# Patient Record
Sex: MALE | Race: WHITE | Employment: OTHER | URBAN - METROPOLITAN AREA
[De-identification: names, ages, dates, MRNs, and addresses within clinical notes are randomized per-mention and may not be internally consistent; named-entity substitution may affect disease eponyms.]

---

## 2019-02-28 ENCOUNTER — HOSPITAL ENCOUNTER (OUTPATIENT)
Dept: PREADMISSION TESTING | Age: 69
Discharge: HOME | End: 2019-02-28
Payer: MEDICARE

## 2019-02-28 VITALS
HEART RATE: 72 BPM | WEIGHT: 220.5 LBS | OXYGEN SATURATION: 98 % | HEIGHT: 73 IN | BODY MASS INDEX: 29.22 KG/M2 | TEMPERATURE: 98.5 F | DIASTOLIC BLOOD PRESSURE: 77 MMHG | SYSTOLIC BLOOD PRESSURE: 141 MMHG

## 2019-02-28 LAB
ABO + RH BLD: NORMAL
ANION GAP SERPL CALC-SCNC: 9 MMOL/L (ref 5–15)
APPEARANCE UR: CLEAR
BACTERIA URNS QL MICRO: NEGATIVE /HPF
BILIRUB UR QL: NEGATIVE
BLOOD GROUP ANTIBODIES SERPL: NORMAL
BUN SERPL-MCNC: 28 MG/DL (ref 6–20)
BUN/CREAT SERPL: 26 (ref 12–20)
CALCIUM SERPL-MCNC: 9.1 MG/DL (ref 8.5–10.1)
CHLORIDE SERPL-SCNC: 108 MMOL/L (ref 97–108)
CO2 SERPL-SCNC: 25 MMOL/L (ref 21–32)
COLOR UR: ABNORMAL
CREAT SERPL-MCNC: 1.06 MG/DL (ref 0.7–1.3)
EPITH CASTS URNS QL MICRO: ABNORMAL /LPF
ERYTHROCYTE [DISTWIDTH] IN BLOOD BY AUTOMATED COUNT: 12.3 % (ref 11.5–14.5)
EST. AVERAGE GLUCOSE BLD GHB EST-MCNC: 105 MG/DL
GLUCOSE SERPL-MCNC: 130 MG/DL (ref 65–100)
GLUCOSE UR STRIP.AUTO-MCNC: NEGATIVE MG/DL
HBA1C MFR BLD: 5.3 % (ref 4.2–6.3)
HCT VFR BLD AUTO: 48.7 % (ref 36.6–50.3)
HGB BLD-MCNC: 15.9 G/DL (ref 12.1–17)
HGB UR QL STRIP: NEGATIVE
INR PPP: 1 (ref 0.9–1.1)
KETONES UR QL STRIP.AUTO: NEGATIVE MG/DL
LEUKOCYTE ESTERASE UR QL STRIP.AUTO: NEGATIVE
MCH RBC QN AUTO: 31.8 PG (ref 26–34)
MCHC RBC AUTO-ENTMCNC: 32.6 G/DL (ref 30–36.5)
MCV RBC AUTO: 97.4 FL (ref 80–99)
NITRITE UR QL STRIP.AUTO: NEGATIVE
NRBC # BLD: 0 K/UL (ref 0–0.01)
NRBC BLD-RTO: 0 PER 100 WBC
PH UR STRIP: 5.5 [PH] (ref 5–8)
PLATELET # BLD AUTO: 233 K/UL (ref 150–400)
PMV BLD AUTO: 10.5 FL (ref 8.9–12.9)
POTASSIUM SERPL-SCNC: 4.6 MMOL/L (ref 3.5–5.1)
PROT UR STRIP-MCNC: NEGATIVE MG/DL
PROTHROMBIN TIME: 10.2 SEC (ref 9–11.1)
RBC # BLD AUTO: 5 M/UL (ref 4.1–5.7)
RBC #/AREA URNS HPF: ABNORMAL /HPF (ref 0–5)
SODIUM SERPL-SCNC: 142 MMOL/L (ref 136–145)
SP GR UR REFRACTOMETRY: >1.03 (ref 1–1.03)
SPECIMEN EXP DATE BLD: NORMAL
UA: UC IF INDICATED,UAUC: ABNORMAL
UROBILINOGEN UR QL STRIP.AUTO: 0.2 EU/DL (ref 0.2–1)
WBC # BLD AUTO: 8.1 K/UL (ref 4.1–11.1)
WBC URNS QL MICRO: ABNORMAL /HPF (ref 0–4)

## 2019-02-28 PROCEDURE — 85027 COMPLETE CBC AUTOMATED: CPT

## 2019-02-28 PROCEDURE — 83036 HEMOGLOBIN GLYCOSYLATED A1C: CPT

## 2019-02-28 PROCEDURE — 80048 BASIC METABOLIC PNL TOTAL CA: CPT

## 2019-02-28 PROCEDURE — 36415 COLL VENOUS BLD VENIPUNCTURE: CPT

## 2019-02-28 PROCEDURE — 86900 BLOOD TYPING SEROLOGIC ABO: CPT

## 2019-02-28 PROCEDURE — 93005 ELECTROCARDIOGRAM TRACING: CPT

## 2019-02-28 PROCEDURE — 85610 PROTHROMBIN TIME: CPT

## 2019-02-28 PROCEDURE — 81001 URINALYSIS AUTO W/SCOPE: CPT

## 2019-02-28 RX ORDER — ACETAMINOPHEN 500 MG
500 TABLET ORAL
COMMUNITY

## 2019-02-28 RX ORDER — IBUPROFEN 200 MG
400 TABLET ORAL
COMMUNITY
End: 2019-03-12

## 2019-02-28 RX ORDER — MOMETASONE FUROATE 1 MG/G
CREAM TOPICAL DAILY
COMMUNITY

## 2019-02-28 RX ORDER — BISMUTH SUBSALICYLATE 262 MG
1 TABLET,CHEWABLE ORAL DAILY
COMMUNITY

## 2019-02-28 RX ORDER — GALANTAMINE HYDROBROMIDE 8 MG/1
8 TABLET, FILM COATED ORAL DAILY
COMMUNITY

## 2019-03-01 LAB
ATRIAL RATE: 67 BPM
CALCULATED P AXIS, ECG09: 54 DEGREES
CALCULATED R AXIS, ECG10: 52 DEGREES
CALCULATED T AXIS, ECG11: 26 DEGREES
DIAGNOSIS, 93000: NORMAL
P-R INTERVAL, ECG05: 188 MS
Q-T INTERVAL, ECG07: 374 MS
QRS DURATION, ECG06: 86 MS
QTC CALCULATION (BEZET), ECG08: 395 MS
VENTRICULAR RATE, ECG03: 67 BPM

## 2019-03-02 LAB
BACTERIA SPEC CULT: NORMAL
BACTERIA SPEC CULT: NORMAL
SERVICE CMNT-IMP: NORMAL

## 2019-03-08 ENCOUNTER — ANESTHESIA EVENT (OUTPATIENT)
Dept: SURGERY | Age: 69
DRG: 470 | End: 2019-03-08
Payer: MEDICARE

## 2019-03-08 RX ORDER — ACETAMINOPHEN 500 MG
1000 TABLET ORAL ONCE
Status: CANCELLED | OUTPATIENT
Start: 2019-03-11 | End: 2019-03-11

## 2019-03-08 RX ORDER — CEFAZOLIN SODIUM/WATER 2 G/20 ML
2 SYRINGE (ML) INTRAVENOUS ONCE
Status: CANCELLED | OUTPATIENT
Start: 2019-03-11 | End: 2019-03-11

## 2019-03-08 RX ORDER — DEXAMETHASONE SODIUM PHOSPHATE 10 MG/ML
4 INJECTION INTRAMUSCULAR; INTRAVENOUS ONCE
Status: CANCELLED | OUTPATIENT
Start: 2019-03-11 | End: 2019-03-11

## 2019-03-08 RX ORDER — CELECOXIB 200 MG/1
200 CAPSULE ORAL ONCE
Status: CANCELLED | OUTPATIENT
Start: 2019-03-11 | End: 2019-03-11

## 2019-03-08 RX ORDER — PREGABALIN 75 MG/1
75 CAPSULE ORAL ONCE
Status: CANCELLED | OUTPATIENT
Start: 2019-03-11 | End: 2019-03-11

## 2019-03-11 ENCOUNTER — APPOINTMENT (OUTPATIENT)
Dept: GENERAL RADIOLOGY | Age: 69
DRG: 470 | End: 2019-03-11
Attending: ORTHOPAEDIC SURGERY
Payer: MEDICARE

## 2019-03-11 ENCOUNTER — ANESTHESIA (OUTPATIENT)
Dept: SURGERY | Age: 69
DRG: 470 | End: 2019-03-11
Payer: MEDICARE

## 2019-03-11 ENCOUNTER — HOSPITAL ENCOUNTER (INPATIENT)
Age: 69
LOS: 1 days | Discharge: HOME HEALTH | DRG: 470 | End: 2019-03-12
Attending: ORTHOPAEDIC SURGERY | Admitting: ORTHOPAEDIC SURGERY
Payer: MEDICARE

## 2019-03-11 DIAGNOSIS — M16.11 PRIMARY OSTEOARTHRITIS OF RIGHT HIP: Primary | ICD-10-CM

## 2019-03-11 LAB
GLUCOSE BLD STRIP.AUTO-MCNC: 104 MG/DL (ref 65–100)
SERVICE CMNT-IMP: ABNORMAL

## 2019-03-11 PROCEDURE — 74011250636 HC RX REV CODE- 250/636: Performed by: ORTHOPAEDIC SURGERY

## 2019-03-11 PROCEDURE — 97161 PT EVAL LOW COMPLEX 20 MIN: CPT

## 2019-03-11 PROCEDURE — 77030006802 HC BLD SAW RECIP BRSM -B: Performed by: ORTHOPAEDIC SURGERY

## 2019-03-11 PROCEDURE — C1776 JOINT DEVICE (IMPLANTABLE): HCPCS | Performed by: ORTHOPAEDIC SURGERY

## 2019-03-11 PROCEDURE — 77030031139 HC SUT VCRL2 J&J -A: Performed by: ORTHOPAEDIC SURGERY

## 2019-03-11 PROCEDURE — 76060000035 HC ANESTHESIA 2 TO 2.5 HR: Performed by: ORTHOPAEDIC SURGERY

## 2019-03-11 PROCEDURE — 74011000250 HC RX REV CODE- 250: Performed by: ORTHOPAEDIC SURGERY

## 2019-03-11 PROCEDURE — C9290 INJ, BUPIVACAINE LIPOSOME: HCPCS | Performed by: ORTHOPAEDIC SURGERY

## 2019-03-11 PROCEDURE — 77030002933 HC SUT MCRYL J&J -A: Performed by: ORTHOPAEDIC SURGERY

## 2019-03-11 PROCEDURE — 77030011264 HC ELECTRD BLD EXT COVD -A: Performed by: ORTHOPAEDIC SURGERY

## 2019-03-11 PROCEDURE — 65270000029 HC RM PRIVATE

## 2019-03-11 PROCEDURE — 77030011640 HC PAD GRND REM COVD -A: Performed by: ORTHOPAEDIC SURGERY

## 2019-03-11 PROCEDURE — 82962 GLUCOSE BLOOD TEST: CPT

## 2019-03-11 PROCEDURE — 77030018846 HC SOL IRR STRL H20 ICUM -A: Performed by: ORTHOPAEDIC SURGERY

## 2019-03-11 PROCEDURE — 74011250636 HC RX REV CODE- 250/636: Performed by: ANESTHESIOLOGY

## 2019-03-11 PROCEDURE — 76210000006 HC OR PH I REC 0.5 TO 1 HR: Performed by: ORTHOPAEDIC SURGERY

## 2019-03-11 PROCEDURE — 77030035236 HC SUT PDS STRATFX BARB J&J -B: Performed by: ORTHOPAEDIC SURGERY

## 2019-03-11 PROCEDURE — 77030039266 HC ADH SKN EXOFIN S2SG -A: Performed by: ORTHOPAEDIC SURGERY

## 2019-03-11 PROCEDURE — 77030007866 HC KT SPN ANES BBMI -B: Performed by: ANESTHESIOLOGY

## 2019-03-11 PROCEDURE — 77030010938 HC CLP LIG TELE -A: Performed by: ORTHOPAEDIC SURGERY

## 2019-03-11 PROCEDURE — 77030020788: Performed by: ORTHOPAEDIC SURGERY

## 2019-03-11 PROCEDURE — 77030034850: Performed by: ORTHOPAEDIC SURGERY

## 2019-03-11 PROCEDURE — 73501 X-RAY EXAM HIP UNI 1 VIEW: CPT

## 2019-03-11 PROCEDURE — 74011250636 HC RX REV CODE- 250/636

## 2019-03-11 PROCEDURE — 74011000250 HC RX REV CODE- 250

## 2019-03-11 PROCEDURE — 97116 GAIT TRAINING THERAPY: CPT

## 2019-03-11 PROCEDURE — 77030018547 HC SUT ETHBND1 J&J -B: Performed by: ORTHOPAEDIC SURGERY

## 2019-03-11 PROCEDURE — 74011000258 HC RX REV CODE- 258: Performed by: ORTHOPAEDIC SURGERY

## 2019-03-11 PROCEDURE — 77030018836 HC SOL IRR NACL ICUM -A: Performed by: ORTHOPAEDIC SURGERY

## 2019-03-11 PROCEDURE — 76000 FLUOROSCOPY <1 HR PHYS/QHP: CPT

## 2019-03-11 PROCEDURE — 77030036660

## 2019-03-11 PROCEDURE — 74011250637 HC RX REV CODE- 250/637: Performed by: ORTHOPAEDIC SURGERY

## 2019-03-11 PROCEDURE — 77030012935 HC DRSG AQUACEL BMS -B: Performed by: ORTHOPAEDIC SURGERY

## 2019-03-11 PROCEDURE — 0SR904Z REPLACEMENT OF RIGHT HIP JOINT WITH CERAMIC ON POLYETHYLENE SYNTHETIC SUBSTITUTE, OPEN APPROACH: ICD-10-PCS | Performed by: ORTHOPAEDIC SURGERY

## 2019-03-11 PROCEDURE — 77030027138 HC INCENT SPIROMETER -A

## 2019-03-11 PROCEDURE — 76010000171 HC OR TIME 2 TO 2.5 HR INTENSV-TIER 1: Performed by: ORTHOPAEDIC SURGERY

## 2019-03-11 PROCEDURE — 77030020782 HC GWN BAIR PAWS FLX 3M -B

## 2019-03-11 PROCEDURE — 74011000258 HC RX REV CODE- 258

## 2019-03-11 DEVICE — PINNACLE GRIPTION ACETABULAR SHELL SECTOR 56MM OD
Type: IMPLANTABLE DEVICE | Site: HIP | Status: FUNCTIONAL
Brand: PINNACLE GRIPTION

## 2019-03-11 DEVICE — BIOLOX DELTA CERAMIC FEMORAL HEAD +1.5 36MM DIA 12/14 TAPER
Type: IMPLANTABLE DEVICE | Site: HIP | Status: FUNCTIONAL
Brand: BIOLOX DELTA

## 2019-03-11 DEVICE — CORAIL HIP SYSTEM CEMENTLESS FEMORAL STEM 12/14 AMT 135 DEGREES KHO SIZE 12 HA COATED HIGH OFFSET NO COLLAR
Type: IMPLANTABLE DEVICE | Site: HIP | Status: FUNCTIONAL
Brand: CORAIL

## 2019-03-11 DEVICE — PINNACLE CANCELLOUS BONE SCREW 6.5MM X 30MM
Type: IMPLANTABLE DEVICE | Site: HIP | Status: FUNCTIONAL
Brand: PINNACLE

## 2019-03-11 DEVICE — COMPONENT TOT HIP PRIMARY CERM ALTRX: Type: IMPLANTABLE DEVICE | Site: HIP | Status: FUNCTIONAL

## 2019-03-11 DEVICE — PINNACLE CANCELLOUS BONE SCREW 6.5MM X 25MM
Type: IMPLANTABLE DEVICE | Site: HIP | Status: FUNCTIONAL
Brand: PINNACLE

## 2019-03-11 DEVICE — PINNACLE HIP SOLUTIONS ALTRX POLYETHYLENE ACETABULAR LINER NEUTRAL 36MM ID 56MM OD
Type: IMPLANTABLE DEVICE | Site: HIP | Status: FUNCTIONAL
Brand: PINNACLE ALTRX

## 2019-03-11 RX ORDER — SODIUM CHLORIDE, SODIUM LACTATE, POTASSIUM CHLORIDE, CALCIUM CHLORIDE 600; 310; 30; 20 MG/100ML; MG/100ML; MG/100ML; MG/100ML
125 INJECTION, SOLUTION INTRAVENOUS CONTINUOUS
Status: DISCONTINUED | OUTPATIENT
Start: 2019-03-11 | End: 2019-03-11 | Stop reason: HOSPADM

## 2019-03-11 RX ORDER — SODIUM CHLORIDE 0.9 % (FLUSH) 0.9 %
5-40 SYRINGE (ML) INJECTION AS NEEDED
Status: DISCONTINUED | OUTPATIENT
Start: 2019-03-11 | End: 2019-03-11 | Stop reason: HOSPADM

## 2019-03-11 RX ORDER — ONDANSETRON 2 MG/ML
INJECTION INTRAMUSCULAR; INTRAVENOUS AS NEEDED
Status: DISCONTINUED | OUTPATIENT
Start: 2019-03-11 | End: 2019-03-11 | Stop reason: HOSPADM

## 2019-03-11 RX ORDER — ONDANSETRON 2 MG/ML
4 INJECTION INTRAMUSCULAR; INTRAVENOUS AS NEEDED
Status: DISCONTINUED | OUTPATIENT
Start: 2019-03-11 | End: 2019-03-11 | Stop reason: HOSPADM

## 2019-03-11 RX ORDER — FAMOTIDINE 20 MG/1
20 TABLET, FILM COATED ORAL EVERY 12 HOURS
Qty: 30 TAB | Refills: 0 | Status: SHIPPED | OUTPATIENT
Start: 2019-03-11

## 2019-03-11 RX ORDER — OXYCODONE HYDROCHLORIDE 5 MG/1
5-10 TABLET ORAL
Status: DISCONTINUED | OUTPATIENT
Start: 2019-03-11 | End: 2019-03-12 | Stop reason: HOSPADM

## 2019-03-11 RX ORDER — SODIUM CHLORIDE 0.9 % (FLUSH) 0.9 %
5-40 SYRINGE (ML) INJECTION EVERY 8 HOURS
Status: DISCONTINUED | OUTPATIENT
Start: 2019-03-11 | End: 2019-03-12 | Stop reason: HOSPADM

## 2019-03-11 RX ORDER — ONDANSETRON 2 MG/ML
4 INJECTION INTRAMUSCULAR; INTRAVENOUS
Status: ACTIVE | OUTPATIENT
Start: 2019-03-11 | End: 2019-03-12

## 2019-03-11 RX ORDER — NALOXONE HYDROCHLORIDE 0.4 MG/ML
0.4 INJECTION, SOLUTION INTRAMUSCULAR; INTRAVENOUS; SUBCUTANEOUS AS NEEDED
Status: DISCONTINUED | OUTPATIENT
Start: 2019-03-11 | End: 2019-03-12 | Stop reason: HOSPADM

## 2019-03-11 RX ORDER — MIDAZOLAM HYDROCHLORIDE 1 MG/ML
INJECTION, SOLUTION INTRAMUSCULAR; INTRAVENOUS AS NEEDED
Status: DISCONTINUED | OUTPATIENT
Start: 2019-03-11 | End: 2019-03-11 | Stop reason: HOSPADM

## 2019-03-11 RX ORDER — FAMOTIDINE 20 MG/1
20 TABLET, FILM COATED ORAL EVERY 12 HOURS
Status: DISCONTINUED | OUTPATIENT
Start: 2019-03-11 | End: 2019-03-12 | Stop reason: HOSPADM

## 2019-03-11 RX ORDER — TRAMADOL HYDROCHLORIDE 50 MG/1
50 TABLET ORAL
Status: DISCONTINUED | OUTPATIENT
Start: 2019-03-11 | End: 2019-03-12 | Stop reason: HOSPADM

## 2019-03-11 RX ORDER — ACETAMINOPHEN 325 MG/1
650 TABLET ORAL EVERY 6 HOURS
Status: DISCONTINUED | OUTPATIENT
Start: 2019-03-11 | End: 2019-03-12 | Stop reason: HOSPADM

## 2019-03-11 RX ORDER — HYDROXYZINE HYDROCHLORIDE 10 MG/1
10 TABLET, FILM COATED ORAL
Status: DISCONTINUED | OUTPATIENT
Start: 2019-03-11 | End: 2019-03-12 | Stop reason: HOSPADM

## 2019-03-11 RX ORDER — DEXTROSE, SODIUM CHLORIDE, SODIUM LACTATE, POTASSIUM CHLORIDE, AND CALCIUM CHLORIDE 5; .6; .31; .03; .02 G/100ML; G/100ML; G/100ML; G/100ML; G/100ML
125 INJECTION, SOLUTION INTRAVENOUS CONTINUOUS
Status: DISCONTINUED | OUTPATIENT
Start: 2019-03-11 | End: 2019-03-11 | Stop reason: HOSPADM

## 2019-03-11 RX ORDER — CEFAZOLIN SODIUM/WATER 2 G/20 ML
2 SYRINGE (ML) INTRAVENOUS EVERY 8 HOURS
Status: COMPLETED | OUTPATIENT
Start: 2019-03-11 | End: 2019-03-12

## 2019-03-11 RX ORDER — POLYETHYLENE GLYCOL 3350 17 G/17G
17 POWDER, FOR SOLUTION ORAL DAILY
Status: DISCONTINUED | OUTPATIENT
Start: 2019-03-11 | End: 2019-03-12 | Stop reason: HOSPADM

## 2019-03-11 RX ORDER — OXYCODONE HYDROCHLORIDE 5 MG/1
5 TABLET ORAL
Qty: 60 TAB | Refills: 0 | Status: SHIPPED | OUTPATIENT
Start: 2019-03-11 | End: 2019-03-21

## 2019-03-11 RX ORDER — OXYCODONE HYDROCHLORIDE 5 MG/1
5 TABLET ORAL AS NEEDED
Status: DISCONTINUED | OUTPATIENT
Start: 2019-03-11 | End: 2019-03-11 | Stop reason: HOSPADM

## 2019-03-11 RX ORDER — DEXAMETHASONE SODIUM PHOSPHATE 4 MG/ML
INJECTION, SOLUTION INTRA-ARTICULAR; INTRALESIONAL; INTRAMUSCULAR; INTRAVENOUS; SOFT TISSUE AS NEEDED
Status: DISCONTINUED | OUTPATIENT
Start: 2019-03-11 | End: 2019-03-11 | Stop reason: HOSPADM

## 2019-03-11 RX ORDER — SODIUM CHLORIDE 0.9 % (FLUSH) 0.9 %
5-40 SYRINGE (ML) INJECTION EVERY 8 HOURS
Status: DISCONTINUED | OUTPATIENT
Start: 2019-03-11 | End: 2019-03-11 | Stop reason: HOSPADM

## 2019-03-11 RX ORDER — ASPIRIN 81 MG/1
81 TABLET ORAL EVERY 12 HOURS
Qty: 60 TAB | Refills: 0 | Status: SHIPPED | OUTPATIENT
Start: 2019-03-11

## 2019-03-11 RX ORDER — MIDAZOLAM HYDROCHLORIDE 1 MG/ML
1 INJECTION, SOLUTION INTRAMUSCULAR; INTRAVENOUS
Status: DISCONTINUED | OUTPATIENT
Start: 2019-03-11 | End: 2019-03-11 | Stop reason: HOSPADM

## 2019-03-11 RX ORDER — CELECOXIB 200 MG/1
200 CAPSULE ORAL ONCE
Status: COMPLETED | OUTPATIENT
Start: 2019-03-11 | End: 2019-03-11

## 2019-03-11 RX ORDER — DIPHENHYDRAMINE HYDROCHLORIDE 50 MG/ML
12.5 INJECTION, SOLUTION INTRAMUSCULAR; INTRAVENOUS AS NEEDED
Status: DISCONTINUED | OUTPATIENT
Start: 2019-03-11 | End: 2019-03-11 | Stop reason: HOSPADM

## 2019-03-11 RX ORDER — MIDAZOLAM HYDROCHLORIDE 1 MG/ML
1 INJECTION, SOLUTION INTRAMUSCULAR; INTRAVENOUS AS NEEDED
Status: DISCONTINUED | OUTPATIENT
Start: 2019-03-11 | End: 2019-03-11 | Stop reason: HOSPADM

## 2019-03-11 RX ORDER — PHENYLEPHRINE HCL IN 0.9% NACL 0.4MG/10ML
SYRINGE (ML) INTRAVENOUS AS NEEDED
Status: DISCONTINUED | OUTPATIENT
Start: 2019-03-11 | End: 2019-03-11 | Stop reason: HOSPADM

## 2019-03-11 RX ORDER — TRAMADOL HYDROCHLORIDE 50 MG/1
50 TABLET ORAL
Qty: 60 TAB | Refills: 0 | Status: SHIPPED | OUTPATIENT
Start: 2019-03-11 | End: 2019-03-14

## 2019-03-11 RX ORDER — FENTANYL CITRATE 50 UG/ML
25 INJECTION, SOLUTION INTRAMUSCULAR; INTRAVENOUS
Status: DISCONTINUED | OUTPATIENT
Start: 2019-03-11 | End: 2019-03-11 | Stop reason: HOSPADM

## 2019-03-11 RX ORDER — BUPIVACAINE HYDROCHLORIDE 5 MG/ML
INJECTION, SOLUTION EPIDURAL; INTRACAUDAL AS NEEDED
Status: DISCONTINUED | OUTPATIENT
Start: 2019-03-11 | End: 2019-03-11 | Stop reason: HOSPADM

## 2019-03-11 RX ORDER — AMOXICILLIN 250 MG
1 CAPSULE ORAL 2 TIMES DAILY
Qty: 30 TAB | Refills: 0 | Status: SHIPPED | OUTPATIENT
Start: 2019-03-11

## 2019-03-11 RX ORDER — PROPOFOL 10 MG/ML
INJECTION, EMULSION INTRAVENOUS AS NEEDED
Status: DISCONTINUED | OUTPATIENT
Start: 2019-03-11 | End: 2019-03-11 | Stop reason: HOSPADM

## 2019-03-11 RX ORDER — FENTANYL CITRATE 50 UG/ML
INJECTION, SOLUTION INTRAMUSCULAR; INTRAVENOUS AS NEEDED
Status: DISCONTINUED | OUTPATIENT
Start: 2019-03-11 | End: 2019-03-11 | Stop reason: HOSPADM

## 2019-03-11 RX ORDER — PREGABALIN 75 MG/1
75 CAPSULE ORAL ONCE
Status: COMPLETED | OUTPATIENT
Start: 2019-03-11 | End: 2019-03-11

## 2019-03-11 RX ORDER — SODIUM CHLORIDE 9 MG/ML
125 INJECTION, SOLUTION INTRAVENOUS CONTINUOUS
Status: DISPENSED | OUTPATIENT
Start: 2019-03-11 | End: 2019-03-12

## 2019-03-11 RX ORDER — ACETAMINOPHEN 500 MG
1000 TABLET ORAL ONCE
Status: COMPLETED | OUTPATIENT
Start: 2019-03-11 | End: 2019-03-11

## 2019-03-11 RX ORDER — GALANTAMINE HYDROBROMIDE 8 MG/1
8 CAPSULE, EXTENDED RELEASE ORAL
Status: DISCONTINUED | OUTPATIENT
Start: 2019-03-12 | End: 2019-03-12 | Stop reason: CLARIF

## 2019-03-11 RX ORDER — AMOXICILLIN 250 MG
1 CAPSULE ORAL 2 TIMES DAILY
Status: DISCONTINUED | OUTPATIENT
Start: 2019-03-11 | End: 2019-03-12 | Stop reason: HOSPADM

## 2019-03-11 RX ORDER — OXYCODONE HYDROCHLORIDE 5 MG/1
5 TABLET ORAL
Status: DISCONTINUED | OUTPATIENT
Start: 2019-03-11 | End: 2019-03-11

## 2019-03-11 RX ORDER — HYDROMORPHONE HYDROCHLORIDE 1 MG/ML
0.5 INJECTION, SOLUTION INTRAMUSCULAR; INTRAVENOUS; SUBCUTANEOUS
Status: ACTIVE | OUTPATIENT
Start: 2019-03-11 | End: 2019-03-12

## 2019-03-11 RX ORDER — CEFAZOLIN SODIUM/WATER 2 G/20 ML
2 SYRINGE (ML) INTRAVENOUS ONCE
Status: COMPLETED | OUTPATIENT
Start: 2019-03-11 | End: 2019-03-11

## 2019-03-11 RX ORDER — GALANTAMINE 4 MG/1
8 TABLET, FILM COATED ORAL DAILY
Status: DISCONTINUED | OUTPATIENT
Start: 2019-03-11 | End: 2019-03-11 | Stop reason: SDUPTHER

## 2019-03-11 RX ORDER — FACIAL-BODY WIPES
10 EACH TOPICAL DAILY PRN
Status: DISCONTINUED | OUTPATIENT
Start: 2019-03-13 | End: 2019-03-12 | Stop reason: HOSPADM

## 2019-03-11 RX ORDER — CLONIDINE HYDROCHLORIDE 0.1 MG/1
0.1 TABLET ORAL
Status: DISCONTINUED | OUTPATIENT
Start: 2019-03-11 | End: 2019-03-12 | Stop reason: HOSPADM

## 2019-03-11 RX ORDER — DEXAMETHASONE SODIUM PHOSPHATE 10 MG/ML
4 INJECTION INTRAMUSCULAR; INTRAVENOUS ONCE
Status: DISCONTINUED | OUTPATIENT
Start: 2019-03-11 | End: 2019-03-11 | Stop reason: HOSPADM

## 2019-03-11 RX ORDER — LIDOCAINE HYDROCHLORIDE 10 MG/ML
0.5 INJECTION, SOLUTION EPIDURAL; INFILTRATION; INTRACAUDAL; PERINEURAL AS NEEDED
Status: DISCONTINUED | OUTPATIENT
Start: 2019-03-11 | End: 2019-03-11 | Stop reason: HOSPADM

## 2019-03-11 RX ORDER — SODIUM CHLORIDE, SODIUM LACTATE, POTASSIUM CHLORIDE, CALCIUM CHLORIDE 600; 310; 30; 20 MG/100ML; MG/100ML; MG/100ML; MG/100ML
INJECTION, SOLUTION INTRAVENOUS
Status: DISCONTINUED | OUTPATIENT
Start: 2019-03-11 | End: 2019-03-11 | Stop reason: HOSPADM

## 2019-03-11 RX ORDER — FENTANYL CITRATE 50 UG/ML
50 INJECTION, SOLUTION INTRAMUSCULAR; INTRAVENOUS AS NEEDED
Status: DISCONTINUED | OUTPATIENT
Start: 2019-03-11 | End: 2019-03-11 | Stop reason: HOSPADM

## 2019-03-11 RX ORDER — MORPHINE SULFATE 10 MG/ML
2 INJECTION, SOLUTION INTRAMUSCULAR; INTRAVENOUS
Status: DISCONTINUED | OUTPATIENT
Start: 2019-03-11 | End: 2019-03-11 | Stop reason: HOSPADM

## 2019-03-11 RX ORDER — PROPOFOL 10 MG/ML
INJECTION, EMULSION INTRAVENOUS
Status: DISCONTINUED | OUTPATIENT
Start: 2019-03-11 | End: 2019-03-11 | Stop reason: HOSPADM

## 2019-03-11 RX ORDER — ASPIRIN 81 MG/1
81 TABLET ORAL EVERY 12 HOURS
Status: DISCONTINUED | OUTPATIENT
Start: 2019-03-11 | End: 2019-03-12 | Stop reason: HOSPADM

## 2019-03-11 RX ORDER — KETOROLAC TROMETHAMINE 30 MG/ML
15 INJECTION, SOLUTION INTRAMUSCULAR; INTRAVENOUS EVERY 6 HOURS
Status: COMPLETED | OUTPATIENT
Start: 2019-03-11 | End: 2019-03-12

## 2019-03-11 RX ORDER — THERA TABS 400 MCG
1 TAB ORAL DAILY
Status: DISCONTINUED | OUTPATIENT
Start: 2019-03-12 | End: 2019-03-12 | Stop reason: HOSPADM

## 2019-03-11 RX ORDER — SODIUM CHLORIDE 0.9 % (FLUSH) 0.9 %
5-40 SYRINGE (ML) INJECTION AS NEEDED
Status: DISCONTINUED | OUTPATIENT
Start: 2019-03-11 | End: 2019-03-12 | Stop reason: HOSPADM

## 2019-03-11 RX ADMIN — ASPIRIN 81 MG: 81 TABLET ORAL at 12:05

## 2019-03-11 RX ADMIN — Medication 2 G: at 15:31

## 2019-03-11 RX ADMIN — MIDAZOLAM HYDROCHLORIDE 2 MG: 1 INJECTION, SOLUTION INTRAMUSCULAR; INTRAVENOUS at 07:23

## 2019-03-11 RX ADMIN — POLYETHYLENE GLYCOL 3350 17 G: 17 POWDER, FOR SOLUTION ORAL at 12:05

## 2019-03-11 RX ADMIN — ACETAMINOPHEN 650 MG: 325 TABLET ORAL at 12:05

## 2019-03-11 RX ADMIN — SODIUM CHLORIDE, SODIUM LACTATE, POTASSIUM CHLORIDE, CALCIUM CHLORIDE: 600; 310; 30; 20 INJECTION, SOLUTION INTRAVENOUS at 07:27

## 2019-03-11 RX ADMIN — DEXAMETHASONE SODIUM PHOSPHATE 4 MG: 4 INJECTION, SOLUTION INTRA-ARTICULAR; INTRALESIONAL; INTRAMUSCULAR; INTRAVENOUS; SOFT TISSUE at 07:53

## 2019-03-11 RX ADMIN — Medication 10 ML: at 14:00

## 2019-03-11 RX ADMIN — Medication 2 G: at 07:38

## 2019-03-11 RX ADMIN — Medication 80 MCG: at 09:01

## 2019-03-11 RX ADMIN — SODIUM CHLORIDE 125 ML/HR: 900 INJECTION, SOLUTION INTRAVENOUS at 10:35

## 2019-03-11 RX ADMIN — PROPOFOL 20 MG: 10 INJECTION, EMULSION INTRAVENOUS at 09:00

## 2019-03-11 RX ADMIN — KETOROLAC TROMETHAMINE 15 MG: 30 INJECTION, SOLUTION INTRAMUSCULAR at 17:25

## 2019-03-11 RX ADMIN — Medication 40 MCG: at 08:42

## 2019-03-11 RX ADMIN — ACETAMINOPHEN 650 MG: 325 TABLET ORAL at 19:38

## 2019-03-11 RX ADMIN — FENTANYL CITRATE 50 MCG: 50 INJECTION, SOLUTION INTRAMUSCULAR; INTRAVENOUS at 07:29

## 2019-03-11 RX ADMIN — SENNOSIDES AND DOCUSATE SODIUM 1 TABLET: 8.6; 5 TABLET ORAL at 22:23

## 2019-03-11 RX ADMIN — FAMOTIDINE 20 MG: 20 TABLET ORAL at 22:23

## 2019-03-11 RX ADMIN — ONDANSETRON 4 MG: 2 INJECTION INTRAMUSCULAR; INTRAVENOUS at 07:53

## 2019-03-11 RX ADMIN — CELECOXIB 200 MG: 200 CAPSULE ORAL at 06:40

## 2019-03-11 RX ADMIN — Medication 40 MCG: at 08:40

## 2019-03-11 RX ADMIN — FAMOTIDINE 20 MG: 20 TABLET ORAL at 12:05

## 2019-03-11 RX ADMIN — PREGABALIN 75 MG: 75 CAPSULE ORAL at 06:40

## 2019-03-11 RX ADMIN — SODIUM CHLORIDE, SODIUM LACTATE, POTASSIUM CHLORIDE, CALCIUM CHLORIDE: 600; 310; 30; 20 INJECTION, SOLUTION INTRAVENOUS at 09:29

## 2019-03-11 RX ADMIN — Medication 80 MCG: at 08:45

## 2019-03-11 RX ADMIN — ASPIRIN 81 MG: 81 TABLET ORAL at 22:23

## 2019-03-11 RX ADMIN — PROPOFOL 20 MG: 10 INJECTION, EMULSION INTRAVENOUS at 07:34

## 2019-03-11 RX ADMIN — PROPOFOL 20 MG: 10 INJECTION, EMULSION INTRAVENOUS at 08:32

## 2019-03-11 RX ADMIN — FENTANYL CITRATE 50 MCG: 50 INJECTION, SOLUTION INTRAMUSCULAR; INTRAVENOUS at 07:23

## 2019-03-11 RX ADMIN — BUPIVACAINE HYDROCHLORIDE 10 MG: 5 INJECTION, SOLUTION EPIDURAL; INTRACAUDAL at 07:39

## 2019-03-11 RX ADMIN — PROPOFOL 50 MCG/KG/MIN: 10 INJECTION, EMULSION INTRAVENOUS at 07:30

## 2019-03-11 RX ADMIN — PROPOFOL 20 MG: 10 INJECTION, EMULSION INTRAVENOUS at 07:32

## 2019-03-11 RX ADMIN — SODIUM CHLORIDE, SODIUM LACTATE, POTASSIUM CHLORIDE, AND CALCIUM CHLORIDE 125 ML/HR: 600; 310; 30; 20 INJECTION, SOLUTION INTRAVENOUS at 06:40

## 2019-03-11 RX ADMIN — SENNOSIDES AND DOCUSATE SODIUM 1 TABLET: 8.6; 5 TABLET ORAL at 12:05

## 2019-03-11 RX ADMIN — ACETAMINOPHEN 1000 MG: 500 TABLET ORAL at 06:40

## 2019-03-11 NOTE — PROGRESS NOTES
Ortho Post-Op Note    3/11/2019  3:19 PM    POD:  Day of Surgery  S/P:  Procedure(s):  RIGHT TOTAL HIP ARTHROPLASTY ANTERIOR APPROACH  MAC/Spinal 10 mg    Afebrile/elevated BP's, NAD, A&O x 3  Doing well without complaints of nausea  Pain well controlled  Calves soft/NTTP Bilaterally  Incision OK, no drainage or dehiscence. Thigh soft  Dressing clean and dry  Moving lower extremities well. Neurocirculatory exam intact and within normal range. Lab Results   Component Value Date/Time    HGB 15.9 02/28/2019 12:32 PM    INR 1.0 02/28/2019 12:32 PM     Recent Labs     02/28/19  1232   CREA 1.06   BUN 28*     Estimated Creatinine Clearance: 85.2 mL/min (based on SCr of 1.06 mg/dL).     PLAN: Clonidine for hypertension (180/100)  DVT prophylaxis: ASA 81 mg bid  WBAT with PT-mobilization  Pain Control- toraldol, tramadol, oxycodone  Plan to D/C possibly home tomorrow after PT      SOURAV Hernadez

## 2019-03-11 NOTE — PROGRESS NOTES
Problem: Mobility Impaired (Adult and Pediatric)  Goal: *Acute Goals and Plan of Care (Insert Text)  Physical Therapy Goals  Initiated 3/11/2019    1. Patient will move from supine to sit and sit to supine , scoot up and down and roll side to side in bed with independence within 4 days. 2. Patient will perform sit to stand with modified independence within 4 days. 3. Patient will ambulate with modified independence for 300 feet with the least restrictive device within 4 days. 4. Patient will ascend/descend 3 stairs with one handrail(s) with modified independence within 4 days. 5. Patient will perform hip home exercise program per protocol with independence within 4 days. physical Therapy EVALUATION  Patient: Som Light (70 y.o. male)  Date: 3/11/2019  Primary Diagnosis: Primary osteoarthritis of right hip [M16.11]  Procedure(s) (LRB):  RIGHT TOTAL HIP ARTHROPLASTY ANTERIOR APPROACH (Right) Day of Surgery   Precautions:   WBAT, Fall    ASSESSMENT :  Based on the objective data described below, the patient presents with mobility at an overall supervision to contact guard level s/p a THR anterior approach POD 0. Vital signs monitored and recorded below, note elevated BP, discussed with his nurse. Also pt received after he had voided, and note urine was bloody, discussed with his nurse. Anticipate steady gains with mobility skills allowing for discharge to home and HHPT for follow up. Pt reports that he did attend the pre op class and his wife is his . .      Addendum at 24-20-52-61: per pt's nurse, pt is going to be placed on med for elevated BP. At baseline, pt does not take any BP meds, not diagnosed with any HTN.       Vitals:    03/11/19 1357 03/11/19 1402 03/11/19 1405 03/11/19 1409   BP: 155/89 152/88 (!) 171/93 (!) 169/97   BP 1 Location: Right arm Right arm Right arm Right arm   BP Patient Position: Supine;Pre-activity Sitting Standing Sitting;Post activity   Pulse: 81 87 90 81   Resp:       Temp: SpO2: on room air 98%   98%         Patient will benefit from skilled intervention to address the above impairments. Patients rehabilitation potential is considered to be Good  Factors which may influence rehabilitation potential include:   []         None noted  [x]         Mental ability/status, mild impairement  []         Medical condition  []         Home/family situation and support systems  []         Safety awareness  []         Pain tolerance/management  []         Other:      PLAN :  Recommendations and Planned Interventions:  [x]           Bed Mobility Training             []    Neuromuscular Re-Education  [x]           Transfer Training                   []    Orthotic/Prosthetic Training  [x]           Gait Training                         []    Modalities  [x]           Therapeutic Exercises           []    Edema Management/Control  [x]           Therapeutic Activities            [x]    Patient and Family Training/Education  []           Other (comment):    Frequency/Duration: Patient will be followed by physical therapy  twice daily to address goals. Discharge Recommendations: Home Health  Further Equipment Recommendations for Discharge: to be determined. PT will need to follow up tomorrow given pt unsure if he has a walker, needs to check with his wife. Addendum at [phone]: Pt does not have a walker. Order was entered and rehab tech notified. PT will need to follow up tomorrow. SUBJECTIVE:   Patient stated I like to golf, when I asked pt what he is looking forward to doing post surgery.     OBJECTIVE DATA SUMMARY:   Consult received, chart reviewed, pt cleared by nursing  HISTORY:    Past Medical History:   Diagnosis Date    Arthritis     Chronic pain     RIGHT HIP    MCI (mild cognitive impairment)      Past Surgical History:   Procedure Laterality Date    HX COLONOSCOPY  2017     Prior Level of Function/Home Situation: independent, not long retired  Personal factors and/or comorbidities impacting plan of care: MCI    Home Situation  Home Environment: Private residence  # Steps to Enter: 3  Rails to Enter: Yes  Hand Rails : Right  One/Two Story Residence: Two story, live on 1st floor  Living Alone: No  Support Systems: Spouse/Significant Other/Partner  Patient Expects to be Discharged to[de-identified] Private residence  Current DME Used/Available at Home: None(pt thinks, he is to clarify with his wife)    EXAMINATION/PRESENTATION/DECISION MAKING:   Critical Behavior:  Neurologic State: Alert, Appropriate for age  Orientation Level: Appropriate for age, Oriented X4  Cognition: Appropriate decision making, Appropriate for age attention/concentration, Appropriate safety awareness     Hearing: Auditory  Auditory Impairment: None  Skin:  Refer to MD and nursing notes, surgical bandage intact  Edema: none noted  Range Of Motion:  AROM: Generally decreased, functional                       Strength:    Strength: Generally decreased, functional                    Tone & Sensation:                  Sensation: Intact               Coordination:     Vision:      Functional Mobility:  Bed Mobility:     Supine to Sit: Supervision  Sit to Supine: Supervision     Transfers:  Sit to Stand: Contact guard assistance  Stand to Sit: Contact guard assistance                       Balance:   Sitting: Intact; Without support  Standing: Intact; With support  Ambulation/Gait Training:  Distance (ft): 40 Feet (ft)  Assistive Device: Gait belt;Walker, rolling  Ambulation - Level of Assistance: Contact guard assistance        Gait Abnormalities: Decreased step clearance; Step to gait  Right Side Weight Bearing: As tolerated     Base of Support: Widened  Stance: Right decreased  Speed/Nasreen: Slow  Step Length: Left shortened;Right shortened                     Stairs: Therapeutic Exercises:    Ankle pumps    Functional Measure:  Timed up and go:    Timed Get Up And Go Test: 20(extrapolated)       < than 10 seconds=Normal  Greater then 13.5 seconds (in elderly)=Increased fall risk   Fabien GOMES, Rita Bell, Susannah AMES. Predicting the probability for falls in community dwelling older adults using the Timed Up and Go Test. Phys Ther. 2000;80:896-903. Physical Therapy Evaluation Charge Determination   History Examination Presentation Decision-Making   MEDIUM  Complexity : 1-2 comorbidities / personal factors will impact the outcome/ POC  LOW Complexity : 1-2 Standardized tests and measures addressing body structure, function, activity limitation and / or participation in recreation  MEDIUM Complexity : Evolving with changing characteristics  LOW Complexity : FOTO score of       Based on the above components, the patient evaluation is determined to be of the following complexity level: LOW     Pain:  Pain Scale 1: Numeric (0 - 10)  Pain Intensity 1: 0  Pain Location 1: Hip  Pain Orientation 1: Right  Pain Description 1: Aching     Activity Tolerance:   See assessment above  Please refer to the flowsheet for vital signs taken during this treatment. After treatment:   []         Patient left in no apparent distress sitting up in chair  [x]         Patient left in no apparent distress in bed  [x]         Call bell left within reach  [x]         Nursing notified  []         Caregiver present  []         Bed alarm activated    COMMUNICATION/EDUCATION:   The patients plan of care was discussed with: Registered Nurse. [x]         Fall prevention education was provided and the patient/caregiver indicated understanding. [x]         Patient/family have participated as able in goal setting and plan of care. [x]         Patient/family agree to work toward stated goals and plan of care. []         Patient understands intent and goals of therapy, but is neutral about his/her participation. []         Patient is unable to participate in goal setting and plan of care.     Thank you for this referral.  Christina Espinal Jeramy   Time Calculation: 29 mins

## 2019-03-11 NOTE — PROGRESS NOTES
Occupational Therapy   Orders received, chart review completed. Note patient POD #0 from [facility]. Per pathway, OT will follow up on POD #1 for evaluation. Recommend OOB to chair three times a day for meals and self-completion of ADLs as able and medically stable. Thank you. Flavio Hollins, MS, OTR/L

## 2019-03-11 NOTE — DISCHARGE INSTRUCTIONS
Discharge Instructions Anterior Approach   Hip Replacement-Dr. George SGuadalupe Appsperse  Patient Name: Angela Ross  Date of procedure:3/11/2019                                   Procedure:Procedure(s):  RIGHT TOTAL HIP ARTHROPLASTY ANTERIOR APPROACH  Surgeon:Surgeon(s) and Role:     * Salvador Pandey MD - Primary               PCP: None  Date of discharge: 3/12/19                       Follow up appointments   Follow up with Dr. George SGuadalupe Appsperse in 4 weeks. Call [phone] extension [phone]) to make an appointment.  If home health has been arranged for you the agency will contact you to arrange dates/times for visits. Please call them if you do not hear from them within 24 hours after you are discharged. When to call your Orthopaedic Surgeon: Call [phone]. If you need to reach us after 5pm or on a weekend call [phone] and the on call physician will be contacted.  Increased hip pain; unrelieved pain or if you have difficulty or are unable to walk   Signs of infection-if your incision is red; continues to have drainage; drainage has a foul odor or if you have a persistent fever over 101 degrees   Signs of a blood clot in your leg-calf pain, tenderness, redness, swelling of lower leg  When to call your Primary Care Physician:   Concerns about medical conditions such as diabetes, high blood pressure, asthma, congestive heart failure   Call if blood sugars are elevated, persistent headache or dizziness, coughing or congestion, constipation or diarrhea, burning with urination, abnormal heart rate     When to call [phone] go to the nearest emergency room   Sudden onset of chest pain, shortness of breath, difficulty breathing     Activity   Weight bearing as tolerated with walker or crutches.  Refer to your handbook for instructions and pictures   Complete your Home Exercise Program daily as instructed by the physical therapist.  Refer to your handbook for instructions and pictures   Get up every one hour and walk (except at night when sleeping)   AVOID sudden and extreme movement of hip to prevent dislocation   Do not drive or operate heavy machinery    Incision Care   The Aquacel (brown, waterproof) surgical dressing is to remain on your hip for 7 days. On the 7th day have someone gently peel the dressing off by carefully lifting the edge and stretching it slightly to break the adhesive seal and leave incision open to air. You may take a shower with the Aquacel dressing in place   Once the Aquacel is removed, you may get your incision wet but do not submerge your incision under water in a bath tub, hot tub or swimming pool for 6 weeks after surgery. Preventing blood clots    Take aspirin 81 mg twice daily to prevent blood clots. Pain management   Please take scheduled 650 mg tylenol every 6 hours for 4 weeks   Please take scheduled diclofenac 50 mg    Please take tramadol every 6 hours for pain as needed for pain.  For breakthrough pain not relieved by above medications, please take 5-10 mg oxycodone      While taking aspirin and diclofenac, please take famotidine (PEPCID) 20 mg twice daily as prescribed to prevent stomach ulcers/irritation.  If you have a history of stomach ulcers, do not take diclofenac.  Avoid alcoholic beverages while taking pain medication   Do not take any over-the-counter medication for pain except Tylenol (acetaminophen)   Keep ice wrap in place except when walking. Change gel packs every 4 hours   Lie down and elevate your leg on pillows for about 30 minutes after walking to decrease swelling and pain       Diet   Resume usual diet; drink plenty of fluids; eat foods high in fiber   Please take a stool softener (such as Senokot-S or Colace) to prevent constipation while you are taking oxycodone. If constipation occurs, take a laxative (such as Dulcolax tablets, Milk of Magnesia, or a suppository).

## 2019-03-11 NOTE — H&P
H and P    Subjective:         Sal Wilson is a 79 y.o. male who presents with right hip osteoarthritis after failure of conservative mgmt. .     Patient Active Problem List    Diagnosis Date Noted    Primary osteoarthritis of right hip 2019     Family History   Problem Relation Age of Onset    Dementia Mother     Crohn's Disease Father     Anesth Problems Neg Hx       Social History     Tobacco Use    Smoking status: Former Smoker     Years: 10.00     Last attempt to quit:      Years since quittin.2    Smokeless tobacco: Current User   Substance Use Topics    Alcohol use: Yes     Alcohol/week: 2.4 oz     Types: 4 Glasses of wine per week     Past Medical History:   Diagnosis Date    Arthritis     Chronic pain     RIGHT HIP    MCI (mild cognitive impairment)       Past Surgical History:   Procedure Laterality Date    HX COLONOSCOPY        Prior to Admission medications    Medication Sig Start Date End Date Taking? Authorizing Provider   acetaminophen (TYLENOL EXTRA STRENGTH) 500 mg tablet Take 500 mg by mouth every six (6) hours as needed for Pain. Yes Provider, Historical   galantamine (RAZADYNE) 8 mg tablet Take 8 mg by mouth daily. Provider, Historical   multivitamin (ONE A DAY) tablet Take 1 Tab by mouth daily. Provider, Historical   ibuprofen (ADVIL) 200 mg tablet Take 400 mg by mouth every six (6) hours as needed for Pain. Provider, Historical   mometasone (ELOCON) 0.1 % topical cream Apply  to affected area daily.     Provider, Historical     Current Facility-Administered Medications   Medication Dose Route Frequency    lactated Ringers infusion  125 mL/hr IntraVENous CONTINUOUS    dextrose 5% lactated ringers infusion  125 mL/hr IntraVENous CONTINUOUS    sodium chloride (NS) flush 5-40 mL  5-40 mL IntraVENous Q8H    sodium chloride (NS) flush 5-40 mL  5-40 mL IntraVENous PRN    lidocaine (PF) (XYLOCAINE) 10 mg/mL (1 %) injection 0.5 mL  0.5 mL SubCUTAneous PRN  fentaNYL citrate (PF) injection 50 mcg  50 mcg IntraVENous PRN    midazolam (VERSED) injection 1 mg  1 mg IntraVENous PRN    midazolam (VERSED) injection 1 mg  1 mg IntraVENous PRN    ceFAZolin (ANCEF) 2 g/20 mL in sterile water IV syringe  2 g IntraVENous ONCE    dexamethasone (PF) (DECADRON) injection 4 mg  4 mg IntraVENous ONCE      No Known Allergies     Review of Systems:    Negative for fevers, chills, nausea, vomiting, chest pain, shortness of breath, headaches.               Objective:     Patient Vitals for the past 24 hrs:   Temp Pulse Resp BP SpO2   19 0634 98.5 °F (36.9 °C) 69 18 (!) 145/93 95 %       Temp (24hrs), Av.5 °F (36.9 °C), Min:98.5 °F (36.9 °C), Max:98.5 °F (36.9 °C)      Gen: NAD, A&Ox3  Resp: Non-labored breathing  CV: Extremities well perfused  Abd: soft, NT  RLE: pain with stinchfield, no swelling about knee or ankle, skin intact, warm well perfused, SILT in al distributions L3-S1, palpable pedal pulses, no calf tenderness    Imaging Review:     Labs:   Recent Results (from the past 24 hour(s))   GLUCOSE, POC    Collection Time: 19  7:03 AM   Result Value Ref Range    Glucose (POC) 104 (H) 65 - 100 mg/dL    Performed by [facility]          Current Facility-Administered Medications   Medication Dose Route Frequency    lactated Ringers infusion  125 mL/hr IntraVENous CONTINUOUS    dextrose 5% lactated ringers infusion  125 mL/hr IntraVENous CONTINUOUS    sodium chloride (NS) flush 5-40 mL  5-40 mL IntraVENous Q8H    sodium chloride (NS) flush 5-40 mL  5-40 mL IntraVENous PRN    lidocaine (PF) (XYLOCAINE) 10 mg/mL (1 %) injection 0.5 mL  0.5 mL SubCUTAneous PRN    fentaNYL citrate (PF) injection 50 mcg  50 mcg IntraVENous PRN    midazolam (VERSED) injection 1 mg  1 mg IntraVENous PRN    midazolam (VERSED) injection 1 mg  1 mg IntraVENous PRN    ceFAZolin (ANCEF) 2 g/20 mL in sterile water IV syringe  2 g IntraVENous ONCE    dexamethasone (PF) (DECADRON) injection 4 mg  4 mg IntraVENous ONCE         Impression:     Active Problems:    Primary osteoarthritis of right hip (3/11/2019)        Plan:   Plan for right Es Rocha MD

## 2019-03-11 NOTE — PROGRESS NOTES
Primary Nurse Grover Araya RN and Octavio Rdz RN performed a dual skin assessment on this patient No impairment noted  Fred score is 21    Patient does have a rash on the left arm that MD is aware of.

## 2019-03-11 NOTE — H&P
Date of Surgery Update:  Eric Montaño was seen and examined. History and physical has been reviewed. The patient has been examined. There have been no significant clinical changes since the completion of the originally dated History and Physical.  Patient identified by surgeon; surgical site was confirmed by patient and surgeon.     Signed By: Dorothea Jimenez MD     March 11, 2019 7:24 AM

## 2019-03-11 NOTE — ANESTHESIA PREPROCEDURE EVALUATION
Anesthetic History   No history of anesthetic complications            Review of Systems / Medical History  Patient summary reviewed, nursing notes reviewed and pertinent labs reviewed    Pulmonary  Within defined limits                 Neuro/Psych   Within defined limits           Cardiovascular  Within defined limits                     GI/Hepatic/Renal  Within defined limits              Endo/Other  Within defined limits      Arthritis     Other Findings              Physical Exam    Airway  Mallampati: II  TM Distance: > 6 cm  Neck ROM: normal range of motion   Mouth opening: Normal     Cardiovascular  Regular rate and rhythm,  S1 and S2 normal,  no murmur, click, rub, or gallop             Dental  No notable dental hx       Pulmonary  Breath sounds clear to auscultation               Abdominal  GI exam deferred       Other Findings            Anesthetic Plan    ASA: 2  Anesthesia type: MAC and spinal            Anesthetic plan and risks discussed with: Patient

## 2019-03-11 NOTE — OP NOTES
OPERATIVE REPORT  RIGHT TOTAL HIP REPLACEMENT (ANTERIOR APPROACH)    NAME: Washington Cooks  MRN: [de-identified]  :  1950  AGE: 79 y.o. DATE OF SURGERY:  3/11/2019    PREOPERATIVE DIAGNOSIS: Severe degenerative joint disease, right hip. POSTOPERATIVE DIAGNOSIS: Severe degenerative joint disease, right hip. PROCEDURES PERFORMED: Right total hip replacement - Anterior approach    SURGEON: Audelia Bueno MD.    ASSISTANTYuan Edwards    ANESTHESIA: epidural/spinal anesthesia    ESTIMATED BLOOD LOSS: 250 mL. DRAINS: None. COMPLICATIONS: None. SPECIMENS REMOVED: None. PRE-OP ANTIBIOTIC: Ancef 2 gram    IMPLANT:   Implant Name Type Inv. Item Serial No.  Lot No. LRB No. Used Action   CUP ACET SECTOR GRIPTION 56MM -- TI - SNA  CUP ACET SECTOR GRIPTION 56MM -- TI NA [facility] [id number] Right 1 Implanted   LINER ACET PINN NEUT [id number] -- ALTRX - SNA  LINER ACET PINN NEUT [id number] -- ALTRX [facility] [id number] Right 1 Implanted   SCR ACET CANC PINN 6.5X25MM SS --  - SNA  SCR ACET CANC PINN 6.5X25MM SS --  NA [facility] [id number] Right 1 Implanted   SCR BNE CANC PINN 6.5X30MM SS --  - SNA  SCR BNE CANC PINN 6.5X30MM SS --  NA [facility] [id number] Right 1 Implanted   STEM FEM CORAIL2 N-COL SZ 12 --  - SNA  STEM FEM CORAIL2 N-COL SZ 12 --  NA [facility] [id number] Right 1 Implanted   HEAD FEM 36MM +1.5MM NK -- BIOLOX DELTA - SNA  HEAD FEM 36MM +1.5MM NK -- BIOLOX DELTA NA [facility] [id number] Right 1 Implanted       INDICATIONS: The patient is an 79 yrs male with progressive debilitating right hip and groin pain due to progressive osteoarthritis. Symptoms have progressed despite comprehensive conservative treatment and they present for right total hip replacement. Risks include bleeding, infection, damage to the LFCN, leg length descrepency, blood clots, pulmonary embolism, and death.  The patient understood these risks as well as potential benefits and alternatives and elected to proceed. DESCRIPTION OF PROCEDURE: Anesthetic was initiated. Preoperative dose of intravenous antibiotic was given within 30 minutes of incision. One gram of tranexamic acid was also administered intravenously. 2 grams of tranexamic acid with 0.4mL of epi topically at the end of the case. The right side was confirmed during a timeout and the hip was prepped and draped in the usual sterile fashion. Skin was covered with [place] occlusive dressing. The patient underwent straight catheterization at the end of the case. Direct anterior exposure was made to the patient's hip through the sartorius-tensor interval well lateral of the ASIS to protect the LFCN. Anterior hip vasculature including the ascending branches of the lateral circumflex artery were cauterized. Retractors were taken out to observe for bleeding and there was none. A T capsulotomy was made with bovie electrocautery. The Charnley retractor was repositioned for exposure. The femoral neck was osteotomized. Femoral head was removed from the acetabulum, which was exposed and soft tissues were removed. The acetabulum was progressively  reamedand a 56 mm trial shell was impacted with good press-fit. This was removed and a 56 mm [place] Gription shell was impacted in the acetabulum in 40 degrees of abduction in an anatomic-type anteversion. Bone spurs were removed and 6.5 screws x2 were placed. The polyethylene liner was placed. Femur was positioned and elevated from the wound. Appropriate soft tissue releases were made including the posterior capsule and obturator internus. The medullary canal was entered with a box osteotome. The femoral canal was broached to a size 12. Calcar planed and then trialed. A 36 mm , +1 hip ball was the most appropriate for leg length and tension with offset to best match native offset. The hip was dislocated. The trial was removed and the real stem was impacted.  The real hip ball was placed. The hip was reduced. After copious irrigation, the capsule was closed with #1 Stratafix barbed sutures. I irrigated the skin, subcutaneous and deep wound. I closed the fascia of the tensor fascia logan with #1 stratafix barbed sutures. Skin and subcutaneous were irrigated. Soft tissues were infiltrated with local anesthetic. 2 grams of tranexamic acid with 0.4 mL of epi given topically in the wound. Dilute betadine (17mL:1000mL of saline) was used to irrigate the wound followed by a rinse with the pulse lavage with normal saline. Skin and subcutaneous were closed in a standard fashion with 2-0 vicryl and 3-0 monocryl followed by Dermabond. An aquacel dressing was applied. There were no complications. No specimen was sent. The procedure was a RIGHT TOTAL HIP REPLACEMENT using a Depuy total hip construct. The patient was transferred to the recovery room in stable condition. An AP pelvis xray was ordered to be completed in the PACU.      Jazz Avila MD

## 2019-03-11 NOTE — PROGRESS NOTES
Bedside shift change report given to Giovanna Rocha (oncoming nurse) by Misael Pizarro (offgoing nurse). Report included the following information SBAR, Kardex, Intake/Output and MAR.

## 2019-03-11 NOTE — ANESTHESIA PROCEDURE NOTES
Spinal Block    Start time: 3/11/2019 7:35 AM  End time: 3/11/2019 7:40 AM  Performed by: Catina Cotto CRNA  Authorized by: Edwin Chavez MD     Pre-procedure:   Indications: primary anesthetic  Preanesthetic Checklist: risks and benefits discussed and timeout performed    Timeout Time: 07:35          Spinal Block:   Patient Position:  Seated    Prep: Betadine      Location:  L3-4  Technique:  Single shot        Needle:   Needle Type:  Pencil-tip  Needle Gauge:  25 G  Attempts:  1      Events: CSF confirmed, no blood with aspiration and no paresthesia        Assessment:  Insertion:  Uncomplicated  Patient tolerance:  Patient tolerated the procedure well with no immediate complications

## 2019-03-11 NOTE — ANESTHESIA POSTPROCEDURE EVALUATION
Procedure(s):  RIGHT TOTAL HIP ARTHROPLASTY ANTERIOR APPROACH. Anesthesia Post Evaluation        Patient location during evaluation: PACU  Patient participation: complete - patient participated  Level of consciousness: awake and alert  Pain management: adequate  Airway patency: patent  Anesthetic complications: no  Cardiovascular status: acceptable  Respiratory status: acceptable  Hydration status: acceptable  Comments: I have seen and evaluated the patient and is ready for discharge.  Brenden Ewing MD    Post anesthesia nausea and vomiting:  none      Visit Vitals  /89   Pulse 69   Temp 36.7 °C (98 °F)   Resp 15   Wt 100 kg (220 lb 8 oz)   SpO2 99%   BMI 29.09 kg/m²

## 2019-03-12 VITALS
BODY MASS INDEX: 29.09 KG/M2 | RESPIRATION RATE: 16 BRPM | SYSTOLIC BLOOD PRESSURE: 144 MMHG | DIASTOLIC BLOOD PRESSURE: 83 MMHG | TEMPERATURE: 98.8 F | HEART RATE: 76 BPM | WEIGHT: 220.5 LBS | OXYGEN SATURATION: 97 %

## 2019-03-12 LAB
ANION GAP SERPL CALC-SCNC: 8 MMOL/L (ref 5–15)
BUN SERPL-MCNC: 19 MG/DL (ref 6–20)
BUN/CREAT SERPL: 21 (ref 12–20)
CALCIUM SERPL-MCNC: 8.7 MG/DL (ref 8.5–10.1)
CHLORIDE SERPL-SCNC: 110 MMOL/L (ref 97–108)
CO2 SERPL-SCNC: 22 MMOL/L (ref 21–32)
CREAT SERPL-MCNC: 0.92 MG/DL (ref 0.7–1.3)
GLUCOSE SERPL-MCNC: 105 MG/DL (ref 65–100)
HGB BLD-MCNC: 14.8 G/DL (ref 12.1–17)
POTASSIUM SERPL-SCNC: 4 MMOL/L (ref 3.5–5.1)
SODIUM SERPL-SCNC: 140 MMOL/L (ref 136–145)

## 2019-03-12 PROCEDURE — 97165 OT EVAL LOW COMPLEX 30 MIN: CPT

## 2019-03-12 PROCEDURE — 80048 BASIC METABOLIC PNL TOTAL CA: CPT

## 2019-03-12 PROCEDURE — 85018 HEMOGLOBIN: CPT

## 2019-03-12 PROCEDURE — 97110 THERAPEUTIC EXERCISES: CPT

## 2019-03-12 PROCEDURE — 97530 THERAPEUTIC ACTIVITIES: CPT

## 2019-03-12 PROCEDURE — 97116 GAIT TRAINING THERAPY: CPT

## 2019-03-12 PROCEDURE — 74011250637 HC RX REV CODE- 250/637: Performed by: PHYSICIAN ASSISTANT

## 2019-03-12 PROCEDURE — 74011250636 HC RX REV CODE- 250/636: Performed by: ORTHOPAEDIC SURGERY

## 2019-03-12 PROCEDURE — 36415 COLL VENOUS BLD VENIPUNCTURE: CPT

## 2019-03-12 PROCEDURE — 97535 SELF CARE MNGMENT TRAINING: CPT

## 2019-03-12 PROCEDURE — 74011250637 HC RX REV CODE- 250/637: Performed by: ORTHOPAEDIC SURGERY

## 2019-03-12 RX ORDER — DICLOFENAC SODIUM 50 MG/1
50 TABLET, DELAYED RELEASE ORAL 2 TIMES DAILY
Qty: 60 TAB | Refills: 0 | Status: SHIPPED | OUTPATIENT
Start: 2019-03-12 | End: 2019-04-11

## 2019-03-12 RX ORDER — GALANTAMINE 4 MG/1
4 TABLET, FILM COATED ORAL 2 TIMES DAILY WITH MEALS
Status: DISCONTINUED | OUTPATIENT
Start: 2019-03-12 | End: 2019-03-12 | Stop reason: HOSPADM

## 2019-03-12 RX ADMIN — ACETAMINOPHEN 650 MG: 325 TABLET ORAL at 12:56

## 2019-03-12 RX ADMIN — KETOROLAC TROMETHAMINE 15 MG: 30 INJECTION, SOLUTION INTRAMUSCULAR at 11:05

## 2019-03-12 RX ADMIN — SODIUM CHLORIDE 125 ML/HR: 900 INJECTION, SOLUTION INTRAVENOUS at 00:20

## 2019-03-12 RX ADMIN — THERA TABS 1 TABLET: TAB at 08:40

## 2019-03-12 RX ADMIN — ACETAMINOPHEN 650 MG: 325 TABLET ORAL at 00:20

## 2019-03-12 RX ADMIN — Medication 2 G: at 00:20

## 2019-03-12 RX ADMIN — POLYETHYLENE GLYCOL 3350 17 G: 17 POWDER, FOR SOLUTION ORAL at 08:40

## 2019-03-12 RX ADMIN — OXYCODONE HYDROCHLORIDE 2.5 MG: 5 TABLET ORAL at 13:01

## 2019-03-12 RX ADMIN — GALANTAMINE 4 MG: 4 TABLET, FILM COATED ORAL at 08:26

## 2019-03-12 RX ADMIN — KETOROLAC TROMETHAMINE 15 MG: 30 INJECTION, SOLUTION INTRAMUSCULAR at 06:07

## 2019-03-12 RX ADMIN — KETOROLAC TROMETHAMINE 15 MG: 30 INJECTION, SOLUTION INTRAMUSCULAR at 00:20

## 2019-03-12 RX ADMIN — FAMOTIDINE 20 MG: 20 TABLET ORAL at 08:40

## 2019-03-12 RX ADMIN — ASPIRIN 81 MG: 81 TABLET ORAL at 08:40

## 2019-03-12 RX ADMIN — ACETAMINOPHEN 650 MG: 325 TABLET ORAL at 06:07

## 2019-03-12 RX ADMIN — SODIUM CHLORIDE 125 ML/HR: 900 INJECTION, SOLUTION INTRAVENOUS at 08:13

## 2019-03-12 RX ADMIN — SENNOSIDES AND DOCUSATE SODIUM 1 TABLET: 8.6; 5 TABLET ORAL at 08:39

## 2019-03-12 NOTE — PROGRESS NOTES
Bedside shift change report given to [facility] (oncoming nurse) by Leim Ormond (offgoing nurse). Report included the following information SBAR, Kardex, Intake/Output and MAR.

## 2019-03-12 NOTE — NURSE NAVIGATOR
[address]  [facility] Orthopaedic Pathway Handoff     FROM:                                TO: At [address]                                                      ([address] [place] [address] or Facility name)  [address]  [address] [address]  Dept: [address]: [phone]                                      Room#:  565/01                                                       Nurse Navigator:  Huseyin Clark RN         SITUATION      ASAScore: ASA 2 - Patient with mild systemic disease with no functional limitations    Admitted:  3/11/2019  Hospital Day: 2      Attending Provider:  No att. providers found     Consultations:  None    PCP:  None   None     Admitting Dx:  Primary osteoarthritis of right hip [M16.11]       Active Problems:    Primary osteoarthritis of right hip (3/11/2019)      1 Day Post-Op of   Procedure(s):  RIGHT TOTAL HIP ARTHROPLASTY ANTERIOR APPROACH   BY: Christine Fonseca MD             ON: 3/11/2019                  Code Status: Full Code             Advance Directive? Yes Not W Pt (Send w/patient)     Isolation:  There are currently no Active Isolations       MDRO: No current active infections    BACKGROUND     Allergies:  No Known Allergies    Past Medical History:   Diagnosis Date    Arthritis     Chronic pain     RIGHT HIP    MCI (mild cognitive impairment)        Past Surgical History:   Procedure Laterality Date    HX COLONOSCOPY  2017       Prior to Admission Medications   Prescriptions Last Dose Informant Patient Reported? Taking?   acetaminophen (TYLENOL EXTRA STRENGTH) 500 mg tablet 3/11/2019 at 0630  Yes Yes   Sig: Take 500 mg by mouth every six (6) hours as needed for Pain.   galantamine (RAZADYNE) 8 mg tablet Unknown at Unknown time  Yes No   Sig: Take 8 mg by mouth daily.    ibuprofen (ADVIL) 200 mg tablet 3/7/2019  Yes No   Sig: Take 400 mg by mouth every six (6) hours as needed for Pain.   mometasone (ELOCON) 0.1 % topical cream Unknown at Unknown time  Yes No   Sig: Apply  to affected area daily. multivitamin (ONE A DAY) tablet 3/4/2019  Yes No   Sig: Take 1 Tab by mouth daily. Facility-Administered Medications: None       Vaccinations: There is no immunization history on file for this patient. ASSESSMENT   Age: 79 y.o. Gender: male        Height:                      Weight:Weight: 100 kg (220 lb 8 oz)     Patient Vitals for the past 8 hrs:   Temp Pulse Resp BP SpO2   03/12/19 1436 98.8 °F (37.1 °C) 76 16 144/83 97 %   03/12/19 1107  78  146/81    03/12/19 1036  82  157/80    03/12/19 1034  79  (!) 153/93    03/12/19 1030  78  141/83             Active Orders   Diet    DIET REGULAR       Orientation: Orientation Level: Oriented X4    Active Lines/Drains:  (Peg Tube / Hcoi / CL or S/L?):no    Urinary Status: Voiding      Last BM: Last Bowel Movement Date: 03/11/19     Skin Integrity: Other (comment)(rash inflamened)   Wound Hip Right-Dressing Status : Clean, dry, and intact    Wound Hip Right-Dressing Type : Aquacel    Mobility: Slightly limited   Weight Bearing Status: WBAT (Weight Bearing as Tolerated)      Gait Training  Assistive Device: Gait belt, Walker, rolling  Ambulation - Level of Assistance: Contact guard assistance  Distance (ft): 210 Feet (ft)  Stairs - Level of Assistance: Contact guard assistance  Number of Stairs Trained: 8(4 steps x2)  Rail Use: None((B) axillary crutches used for support)     On Anticoagulation?  YES  Aspirin  81 mg BID                                       Pain Medications given:  Tramadol 50 mg q 6 hours PRN  Oxycodone 5 mg q 4 hours PRN for up to 10 days                                   Lab Results   Component Value Date/Time    Glucose 105 (H) 03/12/2019 03:31 AM    Hemoglobin A1c 5.3 02/28/2019 12:32 PM    INR 1.0 02/28/2019 12:32 PM    HGB 14.8 03/12/2019 03:31 AM    HGB 15.9 02/28/2019 12:32 PM       Readmission Risks:  Score: RECOMMENDATION     See After Visit Summary (AVS) for:  · Discharge instructions  · After [address]   · Medication Reconciliation          [facility] Orthopaedic Nurse Navigator  JIA Dickson, RN-BC       Office  [phone]  Cell      [phone]  Fax      [phone]  [email or web address]             . y

## 2019-03-12 NOTE — PROGRESS NOTES
I have reviewed discharge instructions with the patient. The patient verbalized understanding. Pt provided with discharge instructions, dressing changes, ice packs and information sheet. No further questions at this time. PT and wife watched video prior to discharge. Medication had been filled at [facility].

## 2019-03-12 NOTE — PROGRESS NOTES
Occupational Therapy EVALUATION/discharge  Patient: Ezekiel Fowler (70 y.o. male)  Date: 3/12/2019  Primary Diagnosis: Primary osteoarthritis of right hip [M16.11]  Procedure(s) (LRB):  RIGHT TOTAL HIP ARTHROPLASTY ANTERIOR APPROACH (Right) 1 Day Post-Op   Precautions:   WBAT, Fall, avoid extreme/sudden ROM    ASSESSMENT:   Based on the objective data described below, the patient presents with ability to complete toileting tasks SBA at RW level, UB care seated with setup, functional mob with RW with SBA, and LB care overall mod A s/p R STERLING- anterior POD 1. He lives with wife and PTA indep with ADL tasks. Received dressed, sitting in chair with wife present. Provided education on RW use, self care transfers, dressing compensatory techniques/aides PRN, and standing grooming tasks. Patient needing multiple cues with proper RW use, sequencing and body mechanics with basic self care transfers. Questionable carry over, wife present and supportive. Nursing aware of decreased processing and carry over. Cleared for dc home from OT standpoint with wife with 24 hour supervision. Further skilled acute occupational therapy is not indicated at this time. Discharge Recommendations: Home with wife- recommend 24 hour supervision   Further Equipment Recommendations for Discharge: hip kit PRN- wife planning to assist      SUBJECTIVE:   Patient stated Hermilo Thompson that again?     OBJECTIVE DATA SUMMARY:   HISTORY:   Past Medical History:   Diagnosis Date    Arthritis     Chronic pain     RIGHT HIP    MCI (mild cognitive impairment)      Past Surgical History:   Procedure Laterality Date    HX COLONOSCOPY  2017       Prior Level of Function/Environment/Context: Home with wife. indep ADL tasks.     Occupations in which the patient is/was successful, what are the barriers preventing that success:   Performance Patterns (routines, roles, habits, and rituals):   Personal Interests and/or values:   Expanded or extensive additional review of patient history:     Home Situation  Home Environment: Private residence  # Steps to Enter: 3  Rails to Enter: Yes  Hand Rails : Right  One/Two Story Residence: Two story, live on 1st floor  Living Alone: No  Support Systems: Spouse/Significant Other/Partner  Patient Expects to be Discharged to[de-identified] Private residence  Current DME Used/Available at Home: Shower chair  Tub or Shower Type: Shower    Hand dominance: Right    EXAMINATION OF PERFORMANCE DEFICITS:  Cognitive/Behavioral Status:  Neurologic State: Alert  Orientation Level: Oriented X4  Cognition: Impulsive;Decreased command following          Hearing: Auditory  Auditory Impairment: None    Vision/Perceptual:                           Acuity: Within Defined Limits    Corrective Lenses: Glasses    Range of Motion:  BUE WDL                            Strength:  BUE WDL                   Coordination:     Fine Motor Skills-Upper: Left Intact; Right Intact    Gross Motor Skills-Upper: Left Intact; Right Intact    Tone & Sensation:  BUE WDL                            Balance:  Sitting: Intact  Standing: Intact; With support    Functional Mobility and Transfers for ADLs:  Bed Mobility:  Rolling: (received up and lef tup)  Supine to Sit: Stand-by assistance;Contact guard assistance(CGA for RLE to EOB)  Sit to Supine: (pt remained OOB in chair)  Scooting: Supervision    Transfers:  Sit to Stand: Stand-by assistance  Stand to Sit: Stand-by assistance  Bed to Chair: Stand-by assistance  Bathroom Mobility: Stand-by assistance  Toilet Transfer : Stand-by assistance(RW, freq vc for positioning/RW use)    ADL Assessment:  Feeding: Independent    Oral Facial Hygiene/Grooming: Supervision    Bathing: Minimum assistance    Upper Body Dressing: Setup    Lower Body Dressing: Minimum assistance(wife plans to assist)    Toileting: Stand by assistance(freq vc for safety/RW use.   Slow processing)                ADL Intervention and task modifications:       Lower Body Dressing Assistance  Pants With Elastic Waist: Compensatory technique training  Socks: Compensatory technique training    Bathing: Patient instructed when bathing to not submerge wound in water, stand to shower or sponge bathe, cover wound with plastic and tape to ensure no water reaches bandage/wound without cues. Patient indicated understanding. Dressing joint: Patient instructed to don/doff Right LE first/last.  Patient instructed and demonstrated to don all clothing while sitting prior to standing, doff all clothing to knees while standing, then sit to doff clothing off from knees to feet in order to facilitate fall prevention, pain management, and energy conservation   Home safety: Patient instructed on home modifications and safety (raise height of ADL objects, appropriate height of chair surfaces, recliner safety, change of floor surfaces, clear pathways) to increase independence and fall prevention. Patient indicated understanding. Standing: Patient instructed and demonstrated to walk up to sink/counter top/surfaces, step into walker to increase safety of joint and fall prevention with Supervision. Patient educated about hip anatomy verbally and with pictures and educated to avoid internal rotation of Right LE. Instructed to apply concept to ADLs within the home (no reaching across body to Right side, square off while using objects, slide objects along surfaces). Patient instructed to increase amount of time standing, observe standing position during ADLs in order to increase even weight bearing through bilateral LEs in order to increase independence with ADLs. Goal to be reached 30 days post - op, per orthopedic surgeon or per PT. Patient indicated understanding.     Functional Measure:  Barthel Index:    Bathin  Bladder: 10  Bowels: 10  Groomin  Dressin  Feeding: 10  Mobility: 10  Stairs: 5  Toilet Use: 10  Transfer (Bed to Chair and Back): 10  Total: 75/100        Percentage of impairment   0% 1-19%   20-39%   40-59%   60-79%   80-99%   100%   Barthel Score 0-100 100 99-80 79-60 59-40 20-39 1-19   0     The Barthel ADL Index: Guidelines  1. The index should be used as a record of what a patient does, not as a record of what a patient could do. 2. The main aim is to establish degree of independence from any help, physical or verbal, however minor and for whatever reason. 3. The need for supervision renders the patient not independent. 4. A patient's performance should be established using the best available evidence. Asking the patient, friends/relatives and nurses are the usual sources, but direct observation and common sense are also important. However direct testing is not needed. 5. Usually the patient's performance over the preceding 24-48 hours is important, but occasionally longer periods will be relevant. 6. Middle categories imply that the patient supplies over 50 per cent of the effort. 7. Use of aids to be independent is allowed. Rosalinda Lyon., Barthel, DGuadalupeW. (9961). Functional evaluation: the Barthel Index. [facility] (14)2. DELILAH Swanson, Reba Rhoades., [place] Edward., [place], [address] [place] [address] (1999). Measuring the change indisability after inpatient rehabilitation; comparison of the responsiveness of the Barthel Index and Functional [place] Measure. Journal of Neurology, Neurosurgery, and Psychiatry, 66(4), 314-611. Deena Herring, N.J.A, CHAKA Álvarez.KWAKU, & Skyler Jack M.A. (2004.) Assessment of post-stroke quality of life in cost-effectiveness studies: The usefulness of the Barthel Index and the EuroQoL-5D.  Quality of Life Research, 15, 702-74         Occupational Therapy Evaluation Charge Determination   History Examination Decision-Making   LOW Complexity : Brief history review  LOW Complexity : 1-3 performance deficits relating to physical, cognitive , or psychosocial skils that result in activity limitations and / or participation restrictions  LOW Complexity : No comorbidities that affect functional and no verbal or physical assistance needed to complete eval tasks       Based on the above components, the patient evaluation is determined to be of the following complexity level: LOW   Pain:  Pain Scale 1: Numeric (0 - 10)  Pain Intensity 1: 3  Pain Location 1: Hip  Pain Orientation 1: Right  Pain Description 1: Aching     Activity Tolerance:   Denies dizziness tolerated toileting tasks well    After treatment:   [x]  Patient left in no apparent distress sitting up in chair  []  Patient left in no apparent distress in bed  [x]  Call bell left within reach  [x]  Nursing notified  [x]  Caregiver present  []  Bed alarm activated    COMMUNICATION/EDUCATION:   Communication/Collaboration:  [x]      Home safety education was provided and the patient/caregiver indicated understanding. [x]      Patient/family have participated as able and agree with findings and recommendations. []      Patient is unable to participate in plan of care at this time.   Findings and recommendations were discussed with: Physical Therapist and RN    Merlin Lang, OT  Time Calculation: 22 mins

## 2019-03-12 NOTE — PROGRESS NOTES
Call regarding: Patient needs a refill of Ventolin HFA inhaler. Could the covering physician refill this today? Please send it to [facility] in [facility]. Caller: self  Number to be reached at:     CELL: [phone]   verified by pt  Timeframe for callback: today    Pharmacy information verified:   Yes Problem: Mobility Impaired (Adult and Pediatric)  Goal: *Acute Goals and Plan of Care (Insert Text)  Physical Therapy Goals  Initiated 3/11/2019    1. Patient will move from supine to sit and sit to supine , scoot up and down and roll side to side in bed with independence within 4 days. 2. Patient will perform sit to stand with modified independence within 4 days. 3. Patient will ambulate with modified independence for 300 feet with the least restrictive device within 4 days. 4. Patient will ascend/descend 3 stairs with one handrail(s) with modified independence within 4 days. 5. Patient will perform hip home exercise program per protocol with independence within 4 days. Outcome: Progressing Towards Goal   physical Therapy TREATMENT  Patient: Ramona Mike (59 y.o. male)  Date: 3/12/2019  Diagnosis: Primary osteoarthritis of right hip [M16.11] <principal problem not specified>  Procedure(s) (LRB):  RIGHT TOTAL HIP ARTHROPLASTY ANTERIOR APPROACH (Right) 1 Day Post-Op  Precautions: WBAT, Fall  Chart, physical therapy assessment, plan of care and goals were reviewed. ASSESSMENT:  Pt received in high fowlers position in bed; agreeable to PT. Reports minimal pain at rest and c/o 5-6/10 discomfort/soreness w/ activity. Pt required SBA-CGA for functional transfers and CGA for stair and gait training. Pt tolerated approx 210 FT w/RW. Demonstrates antalgic gait along w/ step through non-reciprocal gait. Pt also demonstrated flexed posture and required VC for RW proximity during gait. Also required VC +demonstration of correct sequence when amb forward. Completed 8 steps (4 steps x2) using crutches; verbal cues for correct sequence as pt leading w/ incorrect LE during ascend/descend (demosntrated improvement w/trial 2). DBP elevated in sitting (153/93), but remained in 80's for remainder of session. Reviewed HEP, icing, position changes- pt verbalized understanding and w/o further questions at this time.  Will likely follow up this afternoon for additional stair training to ensure proper technique and safety. RN/NSG aware. Vitals:    03/12/19 1030 03/12/19 1034 03/12/19 1036 03/12/19 1107   BP: 141/83 (!) 153/93 157/80 146/81   BP 1 Location:       BP Patient Position: At rest Sitting Standing At rest;Post activity; Sitting   Pulse: 78 79 82 78   Resp:       Temp:       SpO2:       Weight:                Progression toward goals:  [x]      Improving appropriately and progressing toward goals  []      Improving slowly and progressing toward goals  []      Not making progress toward goals and plan of care will be adjusted     PLAN:  Patient continues to benefit from skilled intervention to address the above impairments. Continue treatment per established plan of care. Discharge Recommendations:  Home Health  Further Equipment Recommendations for Discharge:  RW and Axillary Crutches provided     SUBJECTIVE:   Patient stated I didn't get much sleep last night.     OBJECTIVE DATA SUMMARY:   Critical Behavior:  Neurologic State: Alert  Orientation Level: Oriented X4  Cognition: Appropriate for age attention/concentration     Functional Mobility Training:  Bed Mobility:     Supine to Sit: Stand-by assistance;Contact guard assistance(CGA for RLE to EOB)  Sit to Supine: (pt remained OOB in chair)  Scooting: Supervision        Transfers:  Sit to Stand: Contact guard assistance(verbal cues for proper hand placement)  Stand to Sit: Contact guard assistance                             Balance:  Sitting: Intact  Standing: Intact; With support(RW)  Ambulation/Gait Training:  Distance (ft): 210 Feet (ft)  Assistive Device: Gait belt;Walker, rolling  Ambulation - Level of Assistance: Contact guard assistance        Gait Abnormalities: Antalgic;Decreased step clearance(RLE mildly inverted,likely d/t compensating d/t pain)        Base of Support: Narrowed  Stance: Right decreased  Speed/Nasreen: Pace decreased (<100 feet/min)  Step Length: Left shortened;Right shortened  Swing Pattern: Right asymmetrical                Stairs:  Number of Stairs Trained: 8(4 steps x2)  Stairs - Level of Assistance: Contact guard assistance  Rail Use: None((B) axillary crutches used for support)    Therapeutic Exercises:   SUPINE  EXERCISES   Sets   Reps   Active Active Assist   Passive Self ROM   Comments   Ankle Pumps   []                                        []                                        []                                        []                                           Quad Sets  10 [x]                                        []                                        []                                        []                                           Heel Slides  10 [x]                                        []                                        []                                        []                                           Hip Abduction   []                                        []                                        []                                        []                                           Glut Sets  10 [x]                                        []                                        []                                        []                                              []                                        []                                        []                                        []                                              []                                        []                                        []                                        []                                             STANDING  EXERCISES   Sets   Reps   Active Active Assist   Passive Self ROM   Comments   Heel Raises  10 [x]                                        []                                        []                                        []                                        W/ RW support (SBA)   Hip Abduction   []                                        []                                        []                                        []                                              []                                        []                                        []                                        []                                              []                                        []                                        []                                        []                                             Pain:  Pain Scale 1: Numeric (0 - 10)  Pain Intensity 1: 3  Pain Location 1: Hip  Pain Orientation 1: Right  Pain Description 1: Aching     Activity Tolerance:   See above flowsheet for details. Please refer to the flowsheet for vital signs taken during this treatment. After treatment:   [x] Patient left in no apparent distress sitting up in chair  [] Patient left in no apparent distress in bed  [x] Call bell left within reach. Instructed to call for NSG when ready to get back to bed or for additional needs.   [x] Nursing notified  [] Caregiver present  [] Bed alarm activated    COMMUNICATION/COLLABORATION:   The patients plan of care was discussed with: Registered Nurse    Di Banks PTA   Time Calculation: 29 mins

## 2019-03-12 NOTE — PROGRESS NOTES
Problem: Mobility Impaired (Adult and Pediatric)  Goal: *Acute Goals and Plan of Care (Insert Text)  Physical Therapy Goals  Initiated 3/11/2019    1. Patient will move from supine to sit and sit to supine , scoot up and down and roll side to side in bed with independence within 4 days. 2. Patient will perform sit to stand with modified independence within 4 days. 3. Patient will ambulate with modified independence for 300 feet with the least restrictive device within 4 days. 4. Patient will ascend/descend 3 stairs with one handrail(s) with modified independence within 4 days. 5. Patient will perform hip home exercise program per protocol with independence within 4 days. Outcome: Progressing Towards Goal  physical Therapy TREATMENT  Patient: Sal Wilson (43 y.o. male)  Date: 3/12/2019  Diagnosis: Primary osteoarthritis of right hip [M16.11] <principal problem not specified>  Procedure(s) (LRB):  RIGHT TOTAL HIP ARTHROPLASTY ANTERIOR APPROACH (Right) 1 Day Post-Op  Precautions: WBAT, Fall  Chart, physical therapy assessment, plan of care and goals were reviewed. ASSESSMENT:  Pt received in semi-high oconnor's position in bed and wife present at bedside. Pt agreeable to PT at this time. Continues to c/o soreness in lateral, upper RLE. Completed stair training again this afternoon using single rail and wall (simulating pt's home set up). Demonstrated improved sequencing w/ minimal verbal cuing. Completed/cleared 8 steps (4 steps x2) w/ CGA. Also noted improved gait as gait pattern becoming more fluid. VC for upright posture. Supervision-SBA for functional transfers. Completed 3 reps of sit<>stand to practice proper hand placement during transfer w/ moderate carry-over. Pt ready for d/c from PT standpoint at this time. RN/Charge RN aware.        Progression toward goals:  [x]      Improving appropriately and progressing toward goals  []      Improving slowly and progressing toward goals  []      Not making progress toward goals and plan of care will be adjusted     PLAN:  Patient continues to benefit from skilled intervention to address the above impairments. Continue treatment per established plan of care. Discharge Recommendations:  Home Health  Further Equipment Recommendations for Discharge:  RW provided; pt declined use of crutches and ensured ability to use single rail to enter home from garage. SUBJECTIVE:   \"That IT Band still sore. Betha Lute \" Ice pack provided post mobility training. OBJECTIVE DATA SUMMARY:   Functional Mobility Training:  Bed Mobility:  Supine to Sit: Supervision  Sit to Supine: Supervision  Scooting: Supervision        Transfers:  Sit to Stand: Stand-by assistance(completed at least 3 reps sit<>stand for hand placement)  Stand to Sit: Stand-by assistance        Bed to Chair: Stand-by assistance                    Ambulation/Gait Training:  Distance (ft): 210 Feet (ft)  Assistive Device: Gait belt;Walker, rolling  Ambulation - Level of Assistance: Contact guard assistance        Gait Abnormalities: Antalgic;Decreased step clearance        Base of Support: Narrowed  Stance: Right decreased  Speed/Nasreen: Pace decreased (<100 feet/min)  Step Length: Left shortened;Right shortened  Swing Pattern: Right asymmetrical                Stairs:  Number of Stairs Trained: 8(4 steps x2)  Stairs - Level of Assistance: Contact guard assistance  Rail Use: None((B) axillary crutches used for support)    Therapeutic Exercises:   Exercises performed per protocol. See morning treatment note for description. Pain:  Pain Scale 1: Numeric (0 - 10)  Pain Intensity 1: 3  Pain Location 1: Hip  Pain Orientation 1: Right  Pain Description 1: Aching     Activity Tolerance:   Good  Please refer to the flowsheet for vital signs taken during this treatment.   After treatment:   [] Patient left in no apparent distress sitting up in chair  [x] Patient left in no apparent distress in bed  [x] Call bell left within reach  [x] Nursing notified  [x] Caregiver present  [] Bed alarm activated    COMMUNICATION/COLLABORATION:   The patients plan of care was discussed with: Registered Nurse    Di Banks PTA   Time Calculation: 41 mins

## 2019-03-12 NOTE — NURSE NAVIGATOR
Reviewed discharge instructions with patient and wife. All questions addressed. Discharged with belongings, walker, dressing change supplies, medications which had been filled at pharmacy. Assisted into car for transport home by wife.

## 2019-03-12 NOTE — PROGRESS NOTES
Spiritual Care Partner Volunteer visited patient in room 565 on 3.12.19. Documented by: : Rev. Tim Cavazos.  Mallika Eden; [facility], to contact [phone] [address]kes call: [phone]

## 2019-03-12 NOTE — ROUTINE PROCESS
Bedside shift change report given to Arleen NicholsRN (oncoming nurse) by Alphia Lesch, RN(offgoing nurse). Report given with SBAR.

## 2019-03-12 NOTE — PROGRESS NOTES
Physical Therapy  3/12/2019    Followed up with pt for afternoon PT prior to d/c. Pt reported he just returned to bed about 10 minutes prior to PT arrival and is not yet ready to get back up. Also reported increased soreness/discomfort in IT Band. Will check back w/ pt.      1405 Followed up w/pt. Pt currently eating lunch in bed. PT and pt also awaiting wife's arrival for mobility/stair training. OT/PT and RN all agreeable to wait for wife's arrival prior to PM session for stair training. Will f/u once more. Lee Fonseca Means, PTA

## 2019-03-12 NOTE — PROGRESS NOTES
Occupational Therapy    Orders received, chart reviewed and patient evaluated by occupational therapy. Recommend patient to discharge to home with 24 hour supervision from wife secondary to impaired processing, carry over and safety awareness. Cleared from OT services with dc home with 24 hour supervision. Recommend with nursing patient to complete as able in order to maintain strength, endurance and independence: OOB to chair 3x/day, ADLs with supervision/setup and mobilizing to the bathroom for toileting with supervision at RW level. Thank you for your assistance. Full evaluation to follow.      Karen Smith, OT

## 2019-03-12 NOTE — PROGRESS NOTES
Care Management Interventions  PCP Verified by CM: Yes  Palliative Care Criteria Met (RRAT>21 & CHF Dx)?: No  Mode of Transport at Discharge: Self  Transition of Care Consult (CM Consult): [address]: No  Reason Outside [place]: Physician referred to specific agency([facility])  [address]: No  Discharge Durable Medical Equipment: No  Health Maintenance Reviewed: Yes  Physical Therapy Consult: Yes  Occupational Therapy Consult: Yes  Speech Therapy Consult: No  Current Support Network: Lives with Spouse  Confirm Follow Up Transport: Family  Plan discussed with Pt/Family/Caregiver: Yes  Freedom of Choice Offered: Yes  [place] Resource Information Provided?: No  Discharge Location  Discharge Placement: Home with home health    Reason for Admission:   Right Total Hip Replacement                   RRAT Score:          10           Plan for utilizing home health:  Yes, referral to [facility]                        Likelihood of Readmission:  low                         Transition of Care Plan:    Home Health/dc today       [facility] accepted the case.  Teofilo

## 2019-03-12 NOTE — PROGRESS NOTES
Resting comfortably. GEN:  NAD.  AOx3   ABD:  S/NT/ND   RLE:  Dressing C/D/I , 5/5 motor, Calf nttp (Bilat), Sensation grossly intact to light touch throughout, 1+ dp/pt pulses, foot perfused    Patient Vitals for the past 24 hrs:   Temp Pulse Resp BP SpO2   03/11/19 2034 96.8 °F (36 °C) 76 16 136/88 96 %   03/11/19 1639  74  135/83 96 %   03/11/19 1409  81  (!) 169/97 98 %   03/11/19 1405  90  (!) 171/93    03/11/19 1402  87  152/88    03/11/19 1357  81  155/89 98 %   03/11/19 1240 98 °F (36.7 °C) 81 15 161/88 99 %   03/11/19 1153 98 °F (36.7 °C) 69 15 154/89 99 %   03/11/19 1048 97.5 °F (36.4 °C) 63 14 124/81 95 %   03/11/19 1030  64 16 121/85 98 %   03/11/19 1025  (!) 59 15  97 %   03/11/19 1020  (!) 59 13  97 %   03/11/19 1015  60 10 115/68 96 %   03/11/19 1010  66 13  97 %   03/11/19 1005  63 14  94 %   03/11/19 1000  66 14 107/69 (!) 88 %   03/11/19 0955  69 16 113/70 (!) 85 %   03/11/19 0952 97.5 °F (36.4 °C) 69 20 110/65 100 %   03/11/19 0950  69 15 109/67 96 %   03/11/19 0634 98.5 °F (36.9 °C) 69 18 (!) 145/93 95 %       Current Facility-Administered Medications   Medication Dose Route Frequency    [START ON 3/12/2019] therapeutic multivitamin (THERAGRAN) tablet 1 Tab  1 Tab Oral DAILY    0.9% sodium chloride infusion  125 mL/hr IntraVENous CONTINUOUS    sodium chloride 0.9 % bolus infusion 500 mL  500 mL IntraVENous ONCE PRN    sodium chloride (NS) flush 5-40 mL  5-40 mL IntraVENous Q8H    sodium chloride (NS) flush 5-40 mL  5-40 mL IntraVENous PRN    acetaminophen (TYLENOL) tablet 650 mg  650 mg Oral Q6H    HYDROmorphone (PF) (DILAUDID) injection 0.5 mg  0.5 mg IntraVENous Q4H PRN    naloxone (NARCAN) injection 0.4 mg  0.4 mg IntraVENous PRN    ceFAZolin (ANCEF) 2 g/20 mL in sterile water IV syringe  2 g IntraVENous Q8H    hydrOXYzine HCl (ATARAX) tablet 10 mg  10 mg Oral Q8H PRN    famotidine (PEPCID) tablet 20 mg  20 mg Oral Q12H    senna-docusate (PERICOLACE) 8.6-50 mg per tablet 1 Tab  1 Tab Oral BID    polyethylene glycol (MIRALAX) packet 17 g  17 g Oral DAILY    [START ON 3/13/2019] bisacodyl (DULCOLAX) suppository 10 mg  10 mg Rectal DAILY PRN    aspirin delayed-release tablet 81 mg  81 mg Oral Q12H    ketorolac (TORADOL) injection 15 mg  15 mg IntraVENous Q6H    ondansetron (ZOFRAN) injection 4 mg  4 mg IntraVENous Q4H PRN    traMADol (ULTRAM) tablet 50 mg  50 mg Oral Q6H PRN    [START ON 3/12/2019] galantamine (RAZADYNE) SR capsule 8 mg  8 mg Oral ACB    oxyCODONE IR (ROXICODONE) tablet 5-10 mg  5-10 mg Oral Q3H PRN    cloNIDine HCl (CATAPRES) tablet 0.1 mg  0.1 mg Oral Q6H PRN       Lab Results   Component Value Date/Time    HGB 15.9 02/28/2019 12:32 PM    INR 1.0 02/28/2019 12:32 PM       Lab Results   Component Value Date/Time    Sodium 142 02/28/2019 12:32 PM    Potassium 4.6 02/28/2019 12:32 PM    Chloride 108 02/28/2019 12:32 PM    CO2 25 02/28/2019 12:32 PM    BUN 28 (H) 02/28/2019 12:32 PM    Creatinine 1.06 02/28/2019 12:32 PM    Calcium 9.1 02/28/2019 12:32 PM            79 y.o. male s/p right direct anterior total hip arthroplasty on 3/11/2019  . Doing well.      Precautions: None  ABX: Complete 24 hours perioperative abx  PATHWAY: Straight cath per protocol  DVT Prophylaxis: ASA 81 mg enteric coated BID  Weight Bearing: WBAT RLE   Pain Control:Tylenol, 50 mg diclofenac to begin evening POD 1, tramadol every 6 hours, oxy 5 mg for breakthrough pain  Disposition: Home, PT

## 2019-03-14 ENCOUNTER — PATIENT OUTREACH (OUTPATIENT)
Dept: CASE MANAGEMENT | Age: 69
End: 2019-03-14

## 2019-03-27 NOTE — DISCHARGE SUMMARY
Discharge Summary     Patient ID:  Eric Montaño  [de-identified]  79 y.o.  1950    Admit Date: 3/11/2019    Discharge Date: 3/27/2019    Admission Diagnoses: Primary osteoarthritis of right hip [M16.11]    Discharge Diagnoses: Active Problems:    Primary osteoarthritis of right hip (3/11/2019)         Admission Condition: Good    Discharge Condition: Good    Last Procedure: Procedure(s):  RIGHT TOTAL HIP ARTHROPLASTY ANTERIOR APPROACH      Medications:   No current facility-administered medications for this encounter. Current Outpatient Medications   Medication Sig    diclofenac EC (VOLTAREN) 50 mg EC tablet Take 1 Tab by mouth two (2) times a day for 30 days.  aspirin delayed-release 81 mg tablet Take 1 Tab by mouth every twelve (12) hours.  famotidine (PEPCID) 20 mg tablet Take 1 Tab by mouth every twelve (12) hours.  senna-docusate (PERICOLACE) 8.6-50 mg per tablet Take 1 Tab by mouth two (2) times a day.  acetaminophen (TYLENOL EXTRA STRENGTH) 500 mg tablet Take 500 mg by mouth every six (6) hours as needed for Pain.  galantamine (RAZADYNE) 8 mg tablet Take 8 mg by mouth daily.  multivitamin (ONE A DAY) tablet Take 1 Tab by mouth daily.  mometasone (ELOCON) 0.1 % topical cream Apply  to affected area daily. Hospital Course: The patient was admitted to the hospital on the day pf surgery and underwent direct anterior total hip arthroplasty. They tolerated the procedure well. Pain was controlled post-operatively with a multimodal pain regiment including diclofenac, tylenol,tramadol,  toradol (if normal creatinine) and oxycodone for breakthrough. Physical therapy began to work with the patient on POD#0 and continued daily. 24 hours of perioperative antibiotics were completed. Aspirin was given for DVT prophylaxis beginning on POD#0. The patient progressed well and was discharged home in stable condition once they cleared therapy.      Consults: None    Significant Diagnostic Studies: post op xrays showed a stable Procedure(s):  RIGHT TOTAL HIP ARTHROPLASTY ANTERIOR APPROACH    Disposition: home    The patient was discharged with the following instructions:   1.) He will take aspirin for DVT prophylaxis, tylenol, diclofenac, and oxycodone for breakthrough pain. 2.) Shower and wound instructions were given. 3.) Activity: Activity as tolerated  4.) Diet: Regular      Follow-up with Saud Barrett MD in 3 weeks after his discharge. Sooner if there is a problem. Cannot display discharge medications since this patient is not currently admitted.       Signed:  Saud Barrett MD  3/27/2019, 7:25 AM

## 2020-11-09 ENCOUNTER — HOSPITAL ENCOUNTER (OUTPATIENT)
Dept: LAB | Age: 70
Discharge: HOME | End: 2020-11-09

## 2020-11-10 LAB
ALBUMIN SERPL-MCNC: 3.8 G/DL (ref 3.5–5)
ALBUMIN/GLOB SERPL: 1.2 {RATIO} (ref 1.1–2.2)
ALP SERPL-CCNC: 80 U/L (ref 45–117)
ALT SERPL-CCNC: 34 U/L (ref 12–78)
AST SERPL-CCNC: 22 U/L (ref 15–37)
BILIRUB DIRECT SERPL-MCNC: 0.2 MG/DL (ref 0–0.2)
BILIRUB SERPL-MCNC: 0.9 MG/DL (ref 0.2–1)
GLOBULIN SER CALC-MCNC: 3.1 G/DL (ref 2–4)
PROT SERPL-MCNC: 6.9 G/DL (ref 6.4–8.2)

## 2022-03-20 PROBLEM — M16.11 PRIMARY OSTEOARTHRITIS OF RIGHT HIP: Status: ACTIVE | Noted: 2019-03-11

## 2023-01-31 ENCOUNTER — HOSPITAL ENCOUNTER (EMERGENCY)
Age: 73
Discharge: HOME | End: 2023-01-31
Attending: EMERGENCY MEDICINE
Payer: MEDICARE

## 2023-01-31 ENCOUNTER — APPOINTMENT (OUTPATIENT)
Dept: CT IMAGING | Age: 73
End: 2023-01-31
Attending: EMERGENCY MEDICINE
Payer: MEDICARE

## 2023-01-31 VITALS
HEART RATE: 68 BPM | OXYGEN SATURATION: 97 % | DIASTOLIC BLOOD PRESSURE: 95 MMHG | SYSTOLIC BLOOD PRESSURE: 139 MMHG | WEIGHT: 200.84 LBS | TEMPERATURE: 97.9 F | BODY MASS INDEX: 26.62 KG/M2 | HEIGHT: 73 IN | RESPIRATION RATE: 15 BRPM

## 2023-01-31 DIAGNOSIS — R41.0 TRANSIENT CONFUSION: Primary | ICD-10-CM

## 2023-01-31 LAB
ALBUMIN SERPL-MCNC: 3.8 G/DL (ref 3.5–5)
ALBUMIN/GLOB SERPL: 1 (ref 1.1–2.2)
ALP SERPL-CCNC: 81 U/L (ref 45–117)
ALT SERPL-CCNC: 42 U/L (ref 12–78)
ANION GAP SERPL CALC-SCNC: 8 MMOL/L (ref 5–15)
APPEARANCE UR: CLEAR
AST SERPL-CCNC: 28 U/L (ref 15–37)
ATRIAL RATE: 64 BPM
BACTERIA URNS QL MICRO: ABNORMAL /HPF
BASOPHILS # BLD: 0.1 K/UL (ref 0–0.1)
BASOPHILS NFR BLD: 1 % (ref 0–1)
BILIRUB SERPL-MCNC: 0.7 MG/DL (ref 0.2–1)
BILIRUB UR QL: NEGATIVE
BUN SERPL-MCNC: 21 MG/DL (ref 6–20)
BUN/CREAT SERPL: 21 (ref 12–20)
CALCIUM SERPL-MCNC: 9.3 MG/DL (ref 8.5–10.1)
CALCULATED P AXIS, ECG09: 87 DEGREES
CALCULATED R AXIS, ECG10: 50 DEGREES
CALCULATED T AXIS, ECG11: 33 DEGREES
CHLORIDE SERPL-SCNC: 105 MMOL/L (ref 97–108)
CO2 SERPL-SCNC: 27 MMOL/L (ref 21–32)
COLOR UR: ABNORMAL
CREAT SERPL-MCNC: 0.99 MG/DL (ref 0.7–1.3)
DIAGNOSIS, 93000: NORMAL
DIFFERENTIAL METHOD BLD: ABNORMAL
EOSINOPHIL # BLD: 0 K/UL (ref 0–0.4)
EOSINOPHIL NFR BLD: 0 % (ref 0–7)
EPITH CASTS URNS QL MICRO: ABNORMAL /LPF
ERYTHROCYTE [DISTWIDTH] IN BLOOD BY AUTOMATED COUNT: 12.3 % (ref 11.5–14.5)
GLOBULIN SER CALC-MCNC: 3.8 G/DL (ref 2–4)
GLUCOSE SERPL-MCNC: 94 MG/DL (ref 65–100)
GLUCOSE UR STRIP.AUTO-MCNC: NEGATIVE MG/DL
HCT VFR BLD AUTO: 51.5 % (ref 36.6–50.3)
HGB BLD-MCNC: 16.3 G/DL (ref 12.1–17)
HGB UR QL STRIP: NEGATIVE
IMM GRANULOCYTES # BLD AUTO: 0.1 K/UL (ref 0–0.04)
IMM GRANULOCYTES NFR BLD AUTO: 1 % (ref 0–0.5)
KETONES UR QL STRIP.AUTO: NEGATIVE MG/DL
LEUKOCYTE ESTERASE UR QL STRIP.AUTO: NEGATIVE
LYMPHOCYTES # BLD: 1.2 K/UL (ref 0.8–3.5)
LYMPHOCYTES NFR BLD: 11 % (ref 12–49)
MAGNESIUM SERPL-MCNC: 2.2 MG/DL (ref 1.6–2.4)
MCH RBC QN AUTO: 31.5 PG (ref 26–34)
MCHC RBC AUTO-ENTMCNC: 31.7 G/DL (ref 30–36.5)
MCV RBC AUTO: 99.6 FL (ref 80–99)
MONOCYTES # BLD: 0.5 K/UL (ref 0–1)
MONOCYTES NFR BLD: 5 % (ref 5–13)
MUCOUS THREADS URNS QL MICRO: ABNORMAL /LPF
NEUTS SEG # BLD: 9.1 K/UL (ref 1.8–8)
NEUTS SEG NFR BLD: 82 % (ref 32–75)
NITRITE UR QL STRIP.AUTO: NEGATIVE
NRBC # BLD: 0 K/UL (ref 0–0.01)
NRBC BLD-RTO: 0 PER 100 WBC
P-R INTERVAL, ECG05: 212 MS
PH UR STRIP: 6 (ref 5–8)
PLATELET # BLD AUTO: 211 K/UL (ref 150–400)
PMV BLD AUTO: 10.1 FL (ref 8.9–12.9)
POTASSIUM SERPL-SCNC: 4.2 MMOL/L (ref 3.5–5.1)
PROT SERPL-MCNC: 7.6 G/DL (ref 6.4–8.2)
PROT UR STRIP-MCNC: NEGATIVE MG/DL
Q-T INTERVAL, ECG07: 392 MS
QRS DURATION, ECG06: 88 MS
QTC CALCULATION (BEZET), ECG08: 404 MS
RBC # BLD AUTO: 5.17 M/UL (ref 4.1–5.7)
RBC #/AREA URNS HPF: ABNORMAL /HPF (ref 0–5)
SODIUM SERPL-SCNC: 140 MMOL/L (ref 136–145)
SP GR UR REFRACTOMETRY: 1.02 (ref 1–1.03)
TROPONIN-HIGH SENSITIVITY: 6 NG/L (ref 0–76)
UR CULT HOLD, URHOLD: NORMAL
UROBILINOGEN UR QL STRIP.AUTO: 0.2 EU/DL (ref 0.2–1)
VENTRICULAR RATE, ECG03: 64 BPM
WBC # BLD AUTO: 10.9 K/UL (ref 4.1–11.1)
WBC URNS QL MICRO: ABNORMAL /HPF (ref 0–4)

## 2023-01-31 PROCEDURE — 99284 EMERGENCY DEPT VISIT MOD MDM: CPT

## 2023-01-31 PROCEDURE — 84484 ASSAY OF TROPONIN QUANT: CPT

## 2023-01-31 PROCEDURE — 93005 ELECTROCARDIOGRAM TRACING: CPT

## 2023-01-31 PROCEDURE — 80053 COMPREHEN METABOLIC PANEL: CPT

## 2023-01-31 PROCEDURE — 83735 ASSAY OF MAGNESIUM: CPT

## 2023-01-31 PROCEDURE — 81001 URINALYSIS AUTO W/SCOPE: CPT

## 2023-01-31 PROCEDURE — 70450 CT HEAD/BRAIN W/O DYE: CPT

## 2023-01-31 PROCEDURE — 36415 COLL VENOUS BLD VENIPUNCTURE: CPT

## 2023-01-31 PROCEDURE — 85025 COMPLETE CBC W/AUTO DIFF WBC: CPT

## 2023-01-31 NOTE — ED TRIAGE NOTES
Patient arrived to ED with wife. Patient has diagnosed dementia. Patient was today at [facility], per staff patient become unresponsive for about 45 sec, became clammy, jerking left leg. Wife denies seizure in the past. During the episode patient was sitting, no fall. In triage patient is ambulatory, denies pain.

## 2023-01-31 NOTE — ED PROVIDER NOTES
72-year-old male with a history of dementia presents with a chief complaint of altered mental status. Patient goes to an adult  regularly. Today he was at  and sat down to have a snack. He had an episode that lasted approximately 45 seconds where he was unresponsive for       Past Medical History:   Diagnosis Date    Arthritis     Chronic pain     RIGHT HIP    Dementia ([address]     MCI (mild cognitive impairment)        Past Surgical History:   Procedure Laterality Date    HX COLONOSCOPY  2017         Family History:   Problem Relation Age of Onset    Dementia Mother     Crohn's Disease Father     Anesth Problems Neg Hx        Social History     Socioeconomic History    Marital status:      Spouse name: Not on file    Number of children: Not on file    Years of education: Not on file    Highest education level: Not on file   Occupational History    Not on file   Tobacco Use    Smoking status: Former     Years: 10.00     Types: Cigarettes     Quit date:      Years since quittin.1    Smokeless tobacco: Current   Substance and Sexual Activity    Alcohol use: Yes     Alcohol/week: 4.0 standard drinks     Types: 4 Glasses of wine per week    Drug use: No    Sexual activity: Not on file   Other Topics Concern    Not on file   Social History Narrative    Not on file     Social Determinants of Health     Financial Resource Strain: Not on file   Food Insecurity: Not on file   Transportation Needs: Not on file   Physical Activity: Not on file   Stress: Not on file   Social Connections: Not on file   Intimate Partner Violence: Not on file   Housing Stability: Not on file         ALLERGIES: Patient has no known allergies.     Review of Systems   Unable to perform ROS: Dementia     Vitals:    23 1354   BP: (!) 142/87   Pulse: 63   Resp: 16   Temp: 97.9 °F (36.6 °C)   SpO2: 99%   Weight: 91.1 kg (200 lb 13.4 oz)   Height: 6' 1\" (1.854 m)            Physical Exam  Vitals and nursing note reviewed. Constitutional:       General: He is not in acute distress. Appearance: Normal appearance. He is not ill-appearing, toxic-appearing or diaphoretic. HENT:      Head: Normocephalic. Eyes:      Extraocular Movements: Extraocular movements intact. Cardiovascular:      Rate and Rhythm: Normal rate. Pulmonary:      Effort: Pulmonary effort is normal. No respiratory distress. Abdominal:      General: There is no distension. Musculoskeletal:         General: Normal range of motion. Cervical back: Normal range of motion. Skin:     General: Skin is dry. Capillary Refill: Capillary refill takes less than 2 seconds. Neurological:      Mental Status: He is alert. Mental status is at baseline. Psychiatric:         Mood and Affect: Mood normal.        Medical Decision Making    Patient presents with an episode of transient confusion which could have been a seizure. CT of the head obtained to rule out intracranial hemorrhage or mass. I have independently reviewed the obtained radiographic images and note no acute abnormality on CT head. Will await formal radiology read. Radiology confirms no acute abnormality on CT head. Other differentials include infection electrolyte abnormalities. Urinalysis shows no evidence of infection. White blood cell count and hemoglobin are normal.  Electrolytes unremarkable. Recommended follow-up with neurology as an outpatient. Discussed my clinical impression(s), any labs and/or radiology results with the patient's wife. I answered any questions and addressed any concerns. Discussed the importance of following up with their primary care physician and/or specialist(s). Discussed signs or symptoms that would warrant return back to the ER for further evaluation. The patient's wife is agreeable with discharge. Amount and/or Complexity of Data Reviewed  Labs: ordered. Radiology: ordered. ECG/medicine tests: ordered.       ED Course as of 01/31/23 [id number] [address] Jan 31, 2023   1420 EKG shows sinus rhythm at a rate of 64, first-degree AV block, otherwise normal intervals, normal axis, no ischemic changes.   This EKG was interpreted by Cesar Waldron MD, ED Physician [RD]      ED Course User Index  [RD] Juaquin Dominguez MD       Procedures

## 2023-04-06 ENCOUNTER — HOSPITAL ENCOUNTER (OUTPATIENT)
Age: 73
End: 2023-04-06
Attending: EMERGENCY MEDICINE
Payer: MEDICARE

## 2023-04-07 NOTE — ED TRIAGE NOTES
Pt arrives via EMS after police reported the pt was found confused and wandering the streets. Called pts wife and she confirmed patient has hx of these cognitive issues and that she is on her way.     Wife is Deandre Matteo- [de-identified]

## 2023-04-07 NOTE — ED PROVIDER NOTES
Patient is a 42-year-old gentleman with Alzheimer's dementia who comes into the emergency department after being found disoriented by EMS and police. The patient is unable to provide any history but denies any pain, shortness of breath, nausea, vomiting, chills. He has no complaints at this time but does not remember his address or birthday and was unable to tell us his legal first name. Patient was identified after his wife reported him missing. The history is provided by the patient, the EMS personnel and a relative. The history is limited by the condition of the patient. Altered mental status   Functional status baseline:  [EPIC#[id number]^NOTE}      Past Medical History:   Diagnosis Date    Arthritis     Chronic pain     RIGHT HIP    Dementia ([facility] [address]     MCI (mild cognitive impairment)        Past Surgical History:   Procedure Laterality Date    HX COLONOSCOPY  2017         Family History:   Problem Relation Age of Onset    Dementia Mother     Crohn's Disease Father     Anesth Problems Neg Hx        Social History     Socioeconomic History    Marital status:      Spouse name: Not on file    Number of children: Not on file    Years of education: Not on file    Highest education level: Not on file   Occupational History    Not on file   Tobacco Use    Smoking status: Former     Years: 10.00     Types: Cigarettes     Quit date:      Years since quittin.2    Smokeless tobacco: Current   Substance and Sexual Activity    Alcohol use:  Yes     Alcohol/week: 4.0 standard drinks     Types: 4 Glasses of wine per week    Drug use: No    Sexual activity: Not on file   Other Topics Concern    Not on file   Social History Narrative    Not on file     Social Determinants of Health     Financial Resource Strain: Not on file   Food Insecurity: Not on file   Transportation Needs: Not on file   Physical Activity: Not on file   Stress: Not on file   Social Connections: Not on file   Intimate Partner Violence: Not on file   Housing Stability: Not on file         ALLERGIES: Patient has no known allergies. Review of Systems   Unable to perform ROS: Dementia     Vitals:    04/06/23 2325   BP: (!) 155/96   Pulse: 88   Resp: 16   Temp: 98.8 °F (37.1 °C)   SpO2: 94%            Physical Exam  Vitals and nursing note reviewed. Constitutional:       Appearance: He is well-developed. He is not ill-appearing. HENT:      Head: Normocephalic and atraumatic. Eyes:      General: No scleral icterus. Cardiovascular:      Rate and Rhythm: Normal rate. Pulmonary:      Effort: Pulmonary effort is normal.   Abdominal:      General: There is no distension. Musculoskeletal:      Cervical back: Normal range of motion. Skin:     Findings: No erythema or rash. Neurological:      General: No focal deficit present. Mental Status: He is alert. He is disoriented and confused. Cranial Nerves: Cranial nerves 2-12 are intact. Sensory: Sensation is intact. Motor: Motor function is intact. Gait: Gait is intact. Psychiatric:         Mood and Affect: Mood normal.         Speech: Speech normal.         Behavior: Behavior is cooperative. Cognition and Memory: Cognition is impaired. Memory is impaired. Medical Decision Making  Amount and/or Complexity of Data Reviewed  Labs: ordered. ED Course as of 04/06/23 2330   Thu Apr 06, 2023   2328 EKG at 10:59 PM shows normal sinus rhythm, premature atrial complexes, 70 bpm, normal axis, normal intervals, no ST changes, no T wave changes [IO]      ED Course User Index  [IO] Jameson Beltre MD       Procedures      Patient's wife arrived to the emergency department and confirmed that Mr. Yaz Elizabeth is at his baseline mental status. He is not acutely confused or disoriented. He was then discharged into her care. The patient/family verbally agrees with the care-plan and verbally conveys that all of their questions have been answered.  The discharge instructions have also been provided to the patient and/or family with some educational information regarding the patient's diagnosis as well a list of reasons why the patient would want to return to the ER prior to their follow-up appointment, should their condition change.

## 2023-04-21 ENCOUNTER — OFFICE VISIT (OUTPATIENT)
Dept: NEUROLOGY | Age: 73
End: 2023-04-21

## 2023-04-21 VITALS
HEART RATE: 79 BPM | HEIGHT: 72 IN | SYSTOLIC BLOOD PRESSURE: 131 MMHG | DIASTOLIC BLOOD PRESSURE: 76 MMHG | RESPIRATION RATE: 14 BRPM | OXYGEN SATURATION: 96 % | BODY MASS INDEX: 27.22 KG/M2 | WEIGHT: 201 LBS

## 2023-04-21 DIAGNOSIS — F02.B11 MODERATE LATE ONSET ALZHEIMER'S DEMENTIA WITH AGITATION (HCC): ICD-10-CM

## 2023-04-21 DIAGNOSIS — R56.9 SEIZURE-LIKE ACTIVITY (HCC): ICD-10-CM

## 2023-04-21 DIAGNOSIS — R40.20 LOC (LOSS OF CONSCIOUSNESS) (HCC): Primary | ICD-10-CM

## 2023-04-21 DIAGNOSIS — G30.1 MODERATE LATE ONSET ALZHEIMER'S DEMENTIA WITH AGITATION (HCC): ICD-10-CM

## 2023-04-21 RX ORDER — RISPERIDONE 0.25 MG/1
0.25 TABLET, FILM COATED ORAL 2 TIMES DAILY
Qty: 30 TABLET | Refills: 3 | Status: SHIPPED | OUTPATIENT
Start: 2023-04-21

## 2023-04-21 RX ORDER — GALANTAMINE HYDROBROMIDE 24 MG/1
24 CAPSULE, EXTENDED RELEASE ORAL
Qty: 90 CAPSULE | Refills: 3 | Status: SHIPPED | OUTPATIENT
Start: 2023-04-21
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
## 2023-04-21 NOTE — PATIENT INSTRUCTIONS
[facility] Test Result Communication    Test results are available in [address] [employer] is the patient portal into our electronic health record. This feature allows patients to see diagnostic test results, immunizations, allergies, past medical and surgical history, current medications, and send messages directly to providers. Our team members at the  can provide additional information and assist with registration. The [employer] support team can be reached at [phone]. In some cases, a provider might need time to explain the results in detail during a follow-up appointment. This might include additional information or context that will help patients understand the reason for next steps in the plan of care recommended by their provider. If a patient chooses to receive diagnostic testing at an imaging center outside of the [facility], it is the patient's responsibility to bring the imaging report and disc to their [address] [place] [address] follow-up appointment. If the test results reveal anything that is particularly noteworthy, we will contact you to discuss the matter and, if necessary, schedule a follow-up appointment at an earlier date. If you have not received your test results by [employer] or other communication within 7 days, please contact our office. An inquiry can be sent to your provider using [employer] Alternatively, appointments can be scheduled via telephone to review results. If a follow-up appointment to review results has not been scheduled, [address] office can be reached at [phone]. For appointments at our [facility] or [facility] office, please call [phone].        10 Formerly named [facility]   Statement to Patients  April 1, 2014      In an effort to ensure the large volume of patient prescription refills is processed in the most efficient and expeditious manner, we are asking our patients to assist us by calling your Pharmacy for all prescription refills, this will include also your  Mail Order Pharmacy. The pharmacy will contact our office electronically to continue the refill process. Please do not wait until the last minute to call your pharmacy. We need at least 48 hours (2days) to fill prescriptions. We also encourage you to call your pharmacy before going to  your prescription to make sure it is ready. With regard to controlled substance prescription refill requests (narcotic refills) that need to be picked up at our office, we ask your cooperation by providing us with at least 72 hours (3days) notice that you will need a refill. We will not refill narcotic prescription refill requests after 4:00pm on any weekday, Monday through Thursday, or after 2:00pm on Fridays, or on the weekends. We encourage everyone to explore another way of getting your prescription refill request processed using [employer], our patient web portal through our electronic medical record system. [employer] is an efficient and effective way to communicate your medication request directly to the office and  downloadable as an jagdeep on your smart phone . [employer] also features a review functionality that allows you to view your medication list as well as leave messages for your physician. Are you ready to get connected? If so please review the attatched instructions or speak to any of our staff to get you set up right away! Thank you so much for your cooperation. Should you have any questions please contact our Practice Administrator.

## 2023-06-26 RX ORDER — RISPERIDONE 0.25 MG/1
TABLET ORAL
Qty: 30 TABLET | OUTPATIENT
Start: 2023-06-26

## 2023-06-29 ENCOUNTER — OFFICE VISIT (OUTPATIENT)
Age: 73
End: 2023-06-29
Payer: MEDICARE

## 2023-06-29 VITALS
DIASTOLIC BLOOD PRESSURE: 86 MMHG | SYSTOLIC BLOOD PRESSURE: 134 MMHG | TEMPERATURE: 96.9 F | HEART RATE: 77 BPM | OXYGEN SATURATION: 95 % | RESPIRATION RATE: 20 BRPM

## 2023-06-29 DIAGNOSIS — G30.1 ALZHEIMER'S DISEASE WITH LATE ONSET (CODE) (HCC): Primary | ICD-10-CM

## 2023-06-29 DIAGNOSIS — R23.8 ABNORMAL FOOT COLOR: ICD-10-CM

## 2023-06-29 DIAGNOSIS — Z75.8 DOES NOT HAVE PRIMARY CARE PROVIDER: ICD-10-CM

## 2023-06-29 PROCEDURE — 1123F ACP DISCUSS/DSCN MKR DOCD: CPT

## 2023-06-29 PROCEDURE — G8427 DOCREV CUR MEDS BY ELIG CLIN: HCPCS

## 2023-06-29 PROCEDURE — G8419 CALC BMI OUT NRM PARAM NOF/U: HCPCS

## 2023-06-29 PROCEDURE — 4004F PT TOBACCO SCREEN RCVD TLK: CPT

## 2023-06-29 PROCEDURE — 99214 OFFICE O/P EST MOD 30 MIN: CPT

## 2023-06-29 PROCEDURE — 3017F COLORECTAL CA SCREEN DOC REV: CPT

## 2023-06-29 RX ORDER — RISPERIDONE 0.25 MG/1
0.25 TABLET ORAL 2 TIMES DAILY
COMMUNITY
Start: 2023-04-21 | End: 2023-06-29 | Stop reason: SDUPTHER

## 2023-06-29 RX ORDER — RISPERIDONE 0.25 MG/1
0.25 TABLET ORAL 2 TIMES DAILY
Qty: 180 TABLET | Refills: 2 | Status: SHIPPED | OUTPATIENT
Start: 2023-06-29

## 2023-06-29 ASSESSMENT — ENCOUNTER SYMPTOMS
ROS SKIN COMMENTS: BILATERAL FEET
COLOR CHANGE: 1

## 2023-06-30 ENCOUNTER — TELEPHONE (OUTPATIENT)
Age: 73
End: 2023-06-30

## 2023-08-16 ENCOUNTER — TELEPHONE (OUTPATIENT)
Age: 73
End: 2023-08-16

## 2023-08-17 ENCOUNTER — TELEPHONE (OUTPATIENT)
Age: 73
End: 2023-08-17

## 2023-08-17 NOTE — TELEPHONE ENCOUNTER
Patient wife requesting to speak with NP Maya Santos due to his last visit was with her. There is no UTI at this time. She just want to discuss his current medical condition with her.

## 2023-08-17 NOTE — TELEPHONE ENCOUNTER
Returned call, pt goes to day support with [facility] and they have noticed along with . Zi James that pt is not acting himself, having urinary incontinence, and urinary urge but unable to go. His first appt to est pcp is not until Nov.  Advised Mrs. Gallo to contact [facility] at [phone] as they will be able to assess pt for probable UTI and rx abx.   She will contact them for assessment

## 2023-08-18 NOTE — TELEPHONE ENCOUNTER
Patient's spouse called in again,I advised there was already a message sent yesterday. She was crying stating she needs to speak to the nurse as soon as possible. I advised I would send another message as high priority. Please contact.

## 2023-08-21 DIAGNOSIS — G30.1 ALZHEIMER'S DISEASE WITH LATE ONSET (CODE) (HCC): ICD-10-CM

## 2023-08-21 RX ORDER — RISPERIDONE 0.5 MG/1
0.5 TABLET ORAL 2 TIMES DAILY
Qty: 180 TABLET | Refills: 1 | Status: SHIPPED | OUTPATIENT
Start: 2023-08-21
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
## 2023-09-08 ENCOUNTER — TELEPHONE (OUTPATIENT)
Age: 73
End: 2023-09-08

## 2023-09-08 NOTE — TELEPHONE ENCOUNTER
Patients wife would like a call from [facility] regarding her  having agitation , very disruptive , cannot focus and wandering. Stated she needs some guidance.     Please contact

## 2023-09-11 ENCOUNTER — TELEPHONE (OUTPATIENT)
Age: 73
End: 2023-09-11

## 2023-09-11 ENCOUNTER — OFFICE VISIT (OUTPATIENT)
Age: 73
End: 2023-09-11
Payer: MEDICARE

## 2023-09-11 VITALS
HEART RATE: 74 BPM | RESPIRATION RATE: 20 BRPM | OXYGEN SATURATION: 97 % | DIASTOLIC BLOOD PRESSURE: 78 MMHG | SYSTOLIC BLOOD PRESSURE: 124 MMHG

## 2023-09-11 DIAGNOSIS — G30.1 ALZHEIMER'S DISEASE WITH LATE ONSET (CODE) (HCC): Primary | ICD-10-CM

## 2023-09-11 DIAGNOSIS — G30.1 ALZHEIMER'S DEMENTIA, LATE ONSET, WITH BEHAVIORAL DISTURBANCE (HCC): ICD-10-CM

## 2023-09-11 DIAGNOSIS — F02.818 ALZHEIMER'S DEMENTIA, LATE ONSET, WITH BEHAVIORAL DISTURBANCE (HCC): ICD-10-CM

## 2023-09-11 PROCEDURE — 99214 OFFICE O/P EST MOD 30 MIN: CPT

## 2023-09-11 PROCEDURE — 3017F COLORECTAL CA SCREEN DOC REV: CPT

## 2023-09-11 PROCEDURE — G8419 CALC BMI OUT NRM PARAM NOF/U: HCPCS

## 2023-09-11 PROCEDURE — 4004F PT TOBACCO SCREEN RCVD TLK: CPT

## 2023-09-11 PROCEDURE — G8427 DOCREV CUR MEDS BY ELIG CLIN: HCPCS

## 2023-09-11 PROCEDURE — 1123F ACP DISCUSS/DSCN MKR DOCD: CPT

## 2023-09-11 NOTE — TELEPHONE ENCOUNTER
Pt will need an urgent PA for his Rexulti 1m dose he is on the started dose but its only for 7 days he will need the next dose to take after that please

## 2023-09-11 NOTE — TELEPHONE ENCOUNTER
RE:Rexulti pripr authorization sent to [facility]    (Key: [id number])    Message from Plan  Prior Authorization Not Required    Will call plan to verify this information is accurate    Phone [phone]    Fax [phone]
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
## 2023-10-11 ENCOUNTER — TELEPHONE (OUTPATIENT)
Age: 73
End: 2023-10-11

## 2023-10-13 NOTE — TELEPHONE ENCOUNTER
Returned her call. Pts wife stated he has been on the [address] now and is on the 2 mg and it has not done anything for these outbursts of his. He is still showing aggression, still throwing things, and pushing. Still not any particular time of day for these episodes. They can not pin point what could be triggering them. He still goes to his adult day center a couple of times a week. They are getting worried as she is also that they are going to kick him out d/t his behavior. The medication also cost them $1700 dollars and if it was the slightest bit of any help with his mood she would figure out a way to make it work but it hasn't touched him.    Please advise,

## 2023-10-16 ENCOUNTER — HOSPITAL ENCOUNTER (EMERGENCY)
Facility: HOSPITAL | Age: 73
Discharge: HOME | End: 2023-10-17
Attending: STUDENT IN AN ORGANIZED HEALTH CARE EDUCATION/TRAINING PROGRAM
Payer: MEDICARE

## 2023-10-16 ENCOUNTER — APPOINTMENT (OUTPATIENT)
Facility: HOSPITAL | Age: 73
End: 2023-10-16
Payer: MEDICARE

## 2023-10-16 DIAGNOSIS — G30.1 ALZHEIMER'S DEMENTIA, LATE ONSET, WITH BEHAVIORAL DISTURBANCE (HCC): Primary | ICD-10-CM

## 2023-10-16 DIAGNOSIS — F02.818 ALZHEIMER'S DEMENTIA, LATE ONSET, WITH BEHAVIORAL DISTURBANCE (HCC): Primary | ICD-10-CM

## 2023-10-16 DIAGNOSIS — F03.918 DEMENTIA WITH AGGRESSIVE BEHAVIOR (HCC): Primary | ICD-10-CM

## 2023-10-16 LAB
ALBUMIN SERPL-MCNC: 4 G/DL (ref 3.5–5)
ALBUMIN/GLOB SERPL: 1.1 (ref 1.1–2.2)
ALP SERPL-CCNC: 83 U/L (ref 45–117)
ALT SERPL-CCNC: 35 U/L (ref 12–78)
ANION GAP SERPL CALC-SCNC: 6 MMOL/L (ref 5–15)
APPEARANCE UR: CLEAR
AST SERPL-CCNC: 29 U/L (ref 15–37)
BACTERIA URNS QL MICRO: NEGATIVE /HPF
BASOPHILS # BLD: 0.1 K/UL (ref 0–0.1)
BASOPHILS NFR BLD: 1 % (ref 0–1)
BILIRUB SERPL-MCNC: 0.6 MG/DL (ref 0.2–1)
BILIRUB UR QL: NEGATIVE
BUN SERPL-MCNC: 18 MG/DL (ref 6–20)
BUN/CREAT SERPL: 18 (ref 12–20)
CALCIUM SERPL-MCNC: 9.5 MG/DL (ref 8.5–10.1)
CHLORIDE SERPL-SCNC: 108 MMOL/L (ref 97–108)
CO2 SERPL-SCNC: 26 MMOL/L (ref 21–32)
COLOR UR: ABNORMAL
COMMENT:: NORMAL
CREAT SERPL-MCNC: 1.01 MG/DL (ref 0.7–1.3)
DIFFERENTIAL METHOD BLD: NORMAL
EOSINOPHIL # BLD: 0.1 K/UL (ref 0–0.4)
EOSINOPHIL NFR BLD: 2 % (ref 0–7)
EPITH CASTS URNS QL MICRO: ABNORMAL /LPF
ERYTHROCYTE [DISTWIDTH] IN BLOOD BY AUTOMATED COUNT: 12.4 % (ref 11.5–14.5)
GLOBULIN SER CALC-MCNC: 3.8 G/DL (ref 2–4)
GLUCOSE SERPL-MCNC: 82 MG/DL (ref 65–100)
GLUCOSE UR STRIP.AUTO-MCNC: NEGATIVE MG/DL
HCT VFR BLD AUTO: 46.5 % (ref 36.6–50.3)
HGB BLD-MCNC: 15.2 G/DL (ref 12.1–17)
HGB UR QL STRIP: NEGATIVE
IMM GRANULOCYTES # BLD AUTO: 0 K/UL (ref 0–0.04)
IMM GRANULOCYTES NFR BLD AUTO: 0 % (ref 0–0.5)
KETONES UR QL STRIP.AUTO: ABNORMAL MG/DL
LEUKOCYTE ESTERASE UR QL STRIP.AUTO: NEGATIVE
LYMPHOCYTES # BLD: 1.9 K/UL (ref 0.8–3.5)
LYMPHOCYTES NFR BLD: 23 % (ref 12–49)
MCH RBC QN AUTO: 31.3 PG (ref 26–34)
MCHC RBC AUTO-ENTMCNC: 32.7 G/DL (ref 30–36.5)
MCV RBC AUTO: 95.9 FL (ref 80–99)
MONOCYTES # BLD: 0.8 K/UL (ref 0–1)
MONOCYTES NFR BLD: 9 % (ref 5–13)
NEUTS SEG # BLD: 5.3 K/UL (ref 1.8–8)
NEUTS SEG NFR BLD: 65 % (ref 32–75)
NITRITE UR QL STRIP.AUTO: NEGATIVE
NRBC # BLD: 0 K/UL (ref 0–0.01)
NRBC BLD-RTO: 0 PER 100 WBC
PH UR STRIP: 5.5 (ref 5–8)
PLATELET # BLD AUTO: 235 K/UL (ref 150–400)
PMV BLD AUTO: 10.4 FL (ref 8.9–12.9)
POTASSIUM SERPL-SCNC: 3.6 MMOL/L (ref 3.5–5.1)
PROT SERPL-MCNC: 7.8 G/DL (ref 6.4–8.2)
PROT UR STRIP-MCNC: NEGATIVE MG/DL
RBC # BLD AUTO: 4.85 M/UL (ref 4.1–5.7)
RBC #/AREA URNS HPF: ABNORMAL /HPF (ref 0–5)
SODIUM SERPL-SCNC: 140 MMOL/L (ref 136–145)
SP GR UR REFRACTOMETRY: 1.03 (ref 1–1.03)
SPECIMEN HOLD: NORMAL
SPECIMEN HOLD: NORMAL
UROBILINOGEN UR QL STRIP.AUTO: 1 EU/DL (ref 0.2–1)
WBC # BLD AUTO: 8.3 K/UL (ref 4.1–11.1)
WBC URNS QL MICRO: ABNORMAL /HPF (ref 0–4)

## 2023-10-16 PROCEDURE — 99284 EMERGENCY DEPT VISIT MOD MDM: CPT

## 2023-10-16 PROCEDURE — 36415 COLL VENOUS BLD VENIPUNCTURE: CPT

## 2023-10-16 PROCEDURE — 70450 CT HEAD/BRAIN W/O DYE: CPT

## 2023-10-16 PROCEDURE — 85025 COMPLETE CBC W/AUTO DIFF WBC: CPT

## 2023-10-16 PROCEDURE — 81001 URINALYSIS AUTO W/SCOPE: CPT

## 2023-10-16 PROCEDURE — 80053 COMPREHEN METABOLIC PANEL: CPT

## 2023-10-16 PROCEDURE — 6370000000 HC RX 637 (ALT 250 FOR IP): Performed by: STUDENT IN AN ORGANIZED HEALTH CARE EDUCATION/TRAINING PROGRAM

## 2023-10-16 RX ORDER — QUETIAPINE FUMARATE 25 MG/1
25 TABLET, FILM COATED ORAL 2 TIMES DAILY
Qty: 60 TABLET | Refills: 2 | Status: SHIPPED | OUTPATIENT
Start: 2023-10-16

## 2023-10-16 RX ORDER — QUETIAPINE FUMARATE 25 MG/1
25 TABLET, FILM COATED ORAL 2 TIMES DAILY
Status: DISCONTINUED | OUTPATIENT
Start: 2023-10-16 | End: 2023-10-17

## 2023-10-16 RX ADMIN — QUETIAPINE FUMARATE 25 MG: 25 TABLET ORAL at 23:59

## 2023-10-16 ASSESSMENT — PAIN - FUNCTIONAL ASSESSMENT: PAIN_FUNCTIONAL_ASSESSMENT: NONE - DENIES PAIN

## 2023-10-16 NOTE — TELEPHONE ENCOUNTER
Returned her call. Gave her the name of the new medication we can try and gave her the instructions if things do not work out to call us back and let us know.
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
## 2023-10-17 VITALS
WEIGHT: 202.6 LBS | SYSTOLIC BLOOD PRESSURE: 129 MMHG | RESPIRATION RATE: 16 BRPM | TEMPERATURE: 98.8 F | OXYGEN SATURATION: 95 % | BODY MASS INDEX: 27.48 KG/M2 | DIASTOLIC BLOOD PRESSURE: 81 MMHG | HEART RATE: 75 BPM

## 2023-10-17 PROCEDURE — 6370000000 HC RX 637 (ALT 250 FOR IP): Performed by: EMERGENCY MEDICINE

## 2023-10-17 RX ORDER — QUETIAPINE FUMARATE 25 MG/1
50 TABLET, FILM COATED ORAL
Status: COMPLETED | OUTPATIENT
Start: 2023-10-17 | End: 2023-10-17

## 2023-10-17 RX ADMIN — QUETIAPINE FUMARATE 50 MG: 25 TABLET ORAL at 11:35

## 2023-10-17 NOTE — PROGRESS NOTES
Palliative Medicine   [place] [phone] (COPE)      Palliative Medicine consultation received and appreciated,     SW called and spoke with Lexus Edmonds, - patient evaluated by Senior Services NP as well as case management, they had meeting w/ spouse and DDNR is completed and the plan is for discharge home with hospice care. Palliative Medicine will cancel consult currently given above- goals of care discussion held by Senior Services and Case Management assisting w/ support and resources. Please re-consult our team if we can be further support in the patient's care.      Thank you for including Palliative Medicine in the care of [address] [place] [address]

## 2023-10-17 NOTE — ED NOTES
Patient placed on bed alarm and door cracked for safety precaution. Patient family left the evening and will return in the morning.       Adri Renee RN  10/17/23 0148

## 2023-10-17 NOTE — PROGRESS NOTES
Spiritual Care Assessment/Progress Note  [facility]    Name: Allen Jacobsen MRN: [de-identified]    Age: 81 y.o. Sex: male   Language: English     Date: 10/17/2023            Total Time Calculated: 34 min              Spiritual Assessment begun in [facility] EMERGENCY DEP  Service Provided For[de-identified] Patient and family together  Referral/Consult From[de-identified] Palliative Care  Encounter Overview/Reason : Advance Care Planning    Spiritual beliefs:      [x] Involved in a deidre tradition/spiritual practice: Galina Kahn      [] Supported by a deidre community:      [] Claims no spiritual orientation:      [] Seeking spiritual identity:           [] Adheres to an individual form of spirituality:      [] Not able to assess:                Identified resources for coping and support system:   Support System: Spouse       [] Prayer                  [] Devotional reading               [] Music                  [] Guided Imagery     [] Pet visits                                        [] Other: (COMMENT)     Specific area/focus of visit   Encounter:    Crisis:    Spiritual/Emotional needs: Type: Spiritual Support  Ritual, Rites and Sacraments:    Grief, Loss, and Adjustments: Type: Anticipatory Grief, Life Adjustments  Ethics/Mediation:    Behavioral Health:    Palliative Care: Type: Palliative Care, Family Care  Advance Care Planning:      Visited patient for palliative initial spiritual assessment. His chart was consulted prior to the visit. He was A&O X1 at time of visit. His wife Lakhwinder Manjarrez was at bedside. The couple have been  some 39 years. She spoke of her hope for resources to assist in her care for him. Consulted with nurse after the visit.      Chaplain Lane, MDiv, MS, [facility]

## 2023-10-17 NOTE — CARE COORDINATION
Senior Services ED Care Management:     Transition of Care:  Return home with wife  Hospice with [facility] (pending acceptance)    CM received consult to assist with placement for patient. Per chart review, patient with advance dementia. Patient to ED with aggression. Per review of outpatient notes, patient has been seeing neurology and has been having outburst for several months. At this time, they have increased. Wife is concerned about safety. Patient held overnight to be seen by CM.    1-31-23 Patient to ED for altered mental status. 4-6-23 Patient to ED by EMS as police found him wondering. Wife had reported him missing. Later in chart it was noted that wife now has a [address] for patient. Patient goes to [facility] 2 days a week. Patient with multiple office visits with neurology. Wife also with multiple calls to neurology to ask for assistance with increasing concern with patient's behavior. There were changes in patient's medication. This CM and Senior Services NP met with patient's wife - Zohaib Melendez ([phone]) in [address]. Patient lives with his wife in a 2 story home. Wife said she wants to clear out the in-law suite that they have on the 1st floor and move patient downstairs. Patient needs assist with bathing and dressing. Patient is ambulatory and does not have any assistive devices. He is incontinent much of  the time. Patient [facility] [address] [facility]. Patient has had [facility] [facility] after a hip surgery. Patient has recently been to outpatient PT and speech therapy at [facility]. Wife said that patient has difficulty processing information and speaking. Speech therapy said there is no benefit to continuing at this time as it is a result of his advancing dementia and he has not showed improvement. Wife said patient has an AMD. This lists her at [facility]. There is no secondary.  Patient's secondary Rolene Freshwater will be their son Smiley Cronin

## 2023-10-17 NOTE — ED NOTES
Bedside and Verbal shift change report given to [facility] (oncoming nurse) by Jill Bauer RN (offgoing nurse). Report included the following information ED Encounter Summary, ED SBAR, MAR, and Recent Results.        Abdulaziz Cortez RN  10/17/23 7809

## 2023-10-17 NOTE — ED PROVIDER NOTES
Care assumed from Dr. Jermaine Mg at 7 AM.  Please see his note for further information. 27-year-old male with dementia presents with increasing agitation with his dementia. Awaiting case management and placement. 10:30am case discussed with Shahana Denise, geriatrics, who requested he be given 50mg seroquel as he has not received a dose of 25mg this morning. Discussing with hospice for possible placement or discharge home with their care at home.   He was then discharged to go to hospice care     Mindy Coburn DO  10/17/23 0416

## 2023-10-17 NOTE — SENIOR SERVICES NOTE
causing increased aggression and outbursts, as well as decline in being able to perform self-care. -Darwin Kc is agreeable to a Hospice consult and also is the patients POA and states that in the event that something would happen to the patient she would want comfort measures only. -We were able to complete the DDNR, a copy was placed in the ED  basket to be scanned into the patient's chart, I was able to place the order for the patient and Darwin Kc was provided with the Original copy plus additional copies. Advance Care Planning     Advance Care Planning (ACP) Physician/NP/PA Conversation      Conducted with: 1.0 hrs    Healthcare Decision Maker:     Patient has been deemed Incapable of making decisions, progressing Dementia/ Alzheimer's Disease. Spouse, Fernie Gray present requesting DDNR order and documentation. Fernie Gray choosing comfort care and seeking Hospice Care. Care Preferences:    Hospitalization: \"If your health worsens and it becomes clear that your chance of recovery is unlikely, what would be your preference regarding hospitalization? \"  The patient would prefer comfort-focused treatment without hospitalization. Ventilation: \"If you were unable to breathe on your own and your chance of recovery was unlikely, what would be your preference about the use of a ventilator (breathing machine) if it was available to you? \"   The patient would NOT desire the use of a ventilator. Resuscitation: \"In the event your heart stopped as a result of an underlying serious health condition, would you want attempts to be made to restart your heart, or would you prefer a natural death? \"   Prefer a natural death. Discussed no aggressive oxygen support and no feeding tube. Conversation Outcomes / Follow-Up Plan:   normal and ACP in process - information provided, considering goals and options  Reviewed DNR/DNI and patient elects DDNR.      Length of Voluntary ACP Conversation in minutes:

## 2023-10-17 NOTE — ED NOTES
Pt's brief changed after incontinent episode of urine. Went over discharge paperwork with pt's caregiver. She verbalized understanding. Pt wheeled out of ED without incident.      Lynette Olguin RN  10/17/23 [id number]

## 2023-10-17 NOTE — ED NOTES
Bedside and Verbal shift change report given to Erica Mcconnell RN (oncoming nurse) by Elham Christian (offgoing nurse). Report included the following information ED Encounter Summary, ED SBAR, MAR, and Recent Results.        King Peter RN  10/17/23 1297

## 2023-10-17 NOTE — ED PROVIDER NOTES
[address] EMERGENCY DEP  EMERGENCY DEPARTMENT ENCOUNTER      Pt Name: Sterling Malcolm  MRN: [de-identified]  [id number] 1950  Date of evaluation: 10/16/2023  Provider: Catarina Fernandes DO    CHIEF COMPLAINT       Chief Complaint   Patient presents with    Aggressive Behavior       PMH   Past Medical History:   Diagnosis Date    Arthritis     Chronic pain     RIGHT HIP    Dementia ([address])     MCI (mild cognitive impairment)          MDM:   Vitals:    Vitals:    10/16/23 2249   BP: (!) 151/97   Pulse: 90   Resp: 16   Temp:    SpO2: 95%           This is a 81 y.o. male with pmhx dementia with baseline mental status Aox0-1 who presents today for cc of aggression. Patient unable to provide history secondary to dementia. Wife at bedside states that the patient has suffered from worsening dementia for the last few months. She has been taking care of him in their own home  by herself. He has had overwhelming symptoms now including frequent incontinence and the reason for the ER visit today is that he has been aggressive and violent. She feels that the episodes of violence have become more frequent and now are threatening her safety. They live in a two-story home and she has had issues with possibly falling down the stairs,  violent outburst near the stove when it was on, etc.  She has gone through outpatient management for aggressive behavior medication management and dementia , but his violent outbursts have been persistent and refractory to medication management. On arrival VS stable. Physical Exam  General: Alert, no acute distress  HEENT: Normocephalic, atraumatic. Neck: ROM normal, supple  Cardio: Heart regular rate and rhythm, cap refill <2seconds  Lungs: No respiratory distress, no wheezing, CTAB  Abdomen: Soft, nontender  MSK: ROM normal, no LE edema  Skin: Warm, dry, no rash  Neuro: No focal neurodeficits, Aox1    Labs grossly unremarkable. CT head with chronic changes, nothing acute.     Discussed

## 2023-10-17 NOTE — ED PROVIDER NOTES
Patient signed out to me by Dr. Valerie Ellison at 11:30 PM.  Have case management and palliative care evaluate the patient in the morning. Pt with advanced dementia and behavioral outbursts. Likely needs placement and possibly hospice care.      She Awna MD  10/16/23 2400

## 2024-04-02 NOTE — PROGRESS NOTES
Discharge Instructions for  [facility]  [address] [place] [address]  Phone [phone]   Fax [phone]      NAME:  Karlo Rojo  YOB: 1940  MEDICAL RECORD NUMBER:  [id number]  DATE:  4/2/2024    Return Appointment:   Discharge with Kelvin De Leon DO      Instructions:   Wound is Healed!  Apply vaseline to callused area daily.     -Obtain diabetic shoes and inserts and wear daily.        Should you experience increased redness, swelling, pain, foul odor, size of wound(s), or have a temperature over 101 degrees please contact the [facility] at [phone] or if after hours contact your primary care physician or go to the hospital emergency department.    PLEASE NOTE: IF YOU ARE UNABLE TO OBTAIN WOUND SUPPLIES, CONTINUE TO USE THE SUPPLIES YOU HAVE AVAILABLE UNTIL YOU ARE ABLE TO REACH US. IT IS MOST IMPORTANT TO KEEP THE WOUND COVERED AT ALL TIMES.    Electronically signed Giulia SHANE RN on 4/2/2024 at 1:01 PM      Resting comfortably. GEN:  NAD.  AOx3   ABD:  S/NT/ND   RLE:  Dressing C/D/I , 5/5 motor, Calf nttp (Bilat), Sensation grossly intact to light touch throughout, 1+ dp/pt pulses, foot perfused    Patient Vitals for the past 24 hrs:   Temp Pulse Resp BP SpO2   03/12/19 0310 98.6 °F (37 °C) 76 16 149/63 98 %   03/11/19 2300 98.2 °F (36.8 °C) 72 16 150/89 97 %   03/11/19 2034 96.8 °F (36 °C) 76 16 136/88 96 %   03/11/19 1639  74  135/83 96 %   03/11/19 1409  81  (!) 169/97 98 %   03/11/19 1405  90  (!) 171/93    03/11/19 1402  87  152/88    03/11/19 1357  81  155/89 98 %   03/11/19 1240 98 °F (36.7 °C) 81 15 161/88 99 %   03/11/19 1153 98 °F (36.7 °C) 69 15 154/89 99 %   03/11/19 1048 97.5 °F (36.4 °C) 63 14 124/81 95 %   03/11/19 1030  64 16 121/85 98 %   03/11/19 1025  (!) 59 15  97 %   03/11/19 1020  (!) 59 13  97 %   03/11/19 1015  60 10 115/68 96 %   03/11/19 1010  66 13  97 %   03/11/19 1005  63 14  94 %   03/11/19 1000  66 14 107/69 (!) 88 %   03/11/19 0955  69 16 113/70 (!) 85 %   03/11/19 0952 97.5 °F (36.4 °C) 69 20 110/65 100 %   03/11/19 0950  69 15 109/67 96 %       Current Facility-Administered Medications   Medication Dose Route Frequency    galantamine (RAZADYNE) tablet 4 mg  4 mg Oral BID WITH MEALS    therapeutic multivitamin (THERAGRAN) tablet 1 Tab  1 Tab Oral DAILY    0.9% sodium chloride infusion  125 mL/hr IntraVENous CONTINUOUS    sodium chloride 0.9 % bolus infusion 500 mL  500 mL IntraVENous ONCE PRN    sodium chloride (NS) flush 5-40 mL  5-40 mL IntraVENous Q8H    sodium chloride (NS) flush 5-40 mL  5-40 mL IntraVENous PRN    acetaminophen (TYLENOL) tablet 650 mg  650 mg Oral Q6H    HYDROmorphone (PF) (DILAUDID) injection 0.5 mg  0.5 mg IntraVENous Q4H PRN    naloxone (NARCAN) injection 0.4 mg  0.4 mg IntraVENous PRN    hydrOXYzine HCl (ATARAX) tablet 10 mg  10 mg Oral Q8H PRN    famotidine (PEPCID) tablet 20 mg  20 mg Oral Q12H    senna-docusate (PERICOLACE) 8.6-50 mg per tablet 1 Tab  1 Tab Oral BID    polyethylene glycol (MIRALAX) packet 17 g  17 g Oral DAILY    [START ON 3/13/2019] bisacodyl (DULCOLAX) suppository 10 mg  10 mg Rectal DAILY PRN    aspirin delayed-release tablet 81 mg  81 mg Oral Q12H    ketorolac (TORADOL) injection 15 mg  15 mg IntraVENous Q6H    ondansetron (ZOFRAN) injection 4 mg  4 mg IntraVENous Q4H PRN    traMADol (ULTRAM) tablet 50 mg  50 mg Oral Q6H PRN    oxyCODONE IR (ROXICODONE) tablet 5-10 mg  5-10 mg Oral Q3H PRN    cloNIDine HCl (CATAPRES) tablet 0.1 mg  0.1 mg Oral Q6H PRN       Lab Results   Component Value Date/Time    HGB 14.8 03/12/2019 03:31 AM    INR 1.0 02/28/2019 12:32 PM       Lab Results   Component Value Date/Time    Sodium 140 03/12/2019 03:31 AM    Potassium 4.0 03/12/2019 03:31 AM    Chloride 110 (H) 03/12/2019 03:31 AM    CO2 22 03/12/2019 03:31 AM    BUN 19 03/12/2019 03:31 AM    Creatinine 0.92 03/12/2019 03:31 AM    Calcium 8.7 03/12/2019 03:31 AM            79 y.o. male s/p right direct anterior total hip arthroplasty on 3/11/2019  . Doing well.      Precautions: None  ABX: Complete 24 hours perioperative abx  PATHWAY: Straight cath per protocol  DVT Prophylaxis: ASA 81 mg enteric coated BID  Weight Bearing: WBAT RLE   Pain Control:Tylenol, 50 mg diclofenac to begin evening POD 1, tramadol every 6 hours, oxy 5 mg for breakthrough pain  Disposition: Home, PT

## 2024-05-02 NOTE — ED NOTES
I have reviewed discharge instructions with the spouse. The spouse verbalized understanding. Copy of discharge instructions provided. Patient ambulated out of ED accompanied by his wife.
OK to refill.    
Refill request for diclofenac-miSOPROStol       Last refilled on  7/21/17 #60 with 5 refills   Last seen on 11/27/17  Script set to [facility] if approved.   
Ambulatory

## (undated) DEVICE — DRSG AQUACEL SURG 3.5 X 10IN -- CONVERT TO ITEM 370183

## (undated) DEVICE — SUTURE VCRL 0 L27IN ABSRB UD CT L40MM 1/2 CIR TAPERPOINT J280H

## (undated) DEVICE — 6619 2 PTNT ISO SYS INCISE AREA&LT;(&GT;&&LT;)&GT;P: Brand: STERI-DRAPE™ IOBAN™ 2

## (undated) DEVICE — SUTURE ETHBND EXCEL SZ 2 L30IN NONABSORBABLE GRN L40MM V-37 MX69G

## (undated) DEVICE — PADDING CAST SPEC 6INX4YD COT --

## (undated) DEVICE — SCRUB DRY SURG EZ SCRUB BRUSH PREOPERATIVE GRN

## (undated) DEVICE — 3M™ STERI-DRAPE™ U-DRAPE 1015: Brand: STERI-DRAPE™

## (undated) DEVICE — STERILE POLYISOPRENE POWDER-FREE SURGICAL GLOVES WITH EMOLLIENT COATING: Brand: PROTEXIS

## (undated) DEVICE — SOLUTION IRRIG 1000ML H2O STRL BLT

## (undated) DEVICE — DEVON™ KNEE AND BODY STRAP 60" X 3" (1.5 M X 7.6 CM): Brand: DEVON

## (undated) DEVICE — COVER,MAYO STAND,STERILE: Brand: MEDLINE

## (undated) DEVICE — Device

## (undated) DEVICE — SUTURE MCRYL SZ 2-0 L36IN ABSRB UD L36MM CT-1 1/2 CIR Y945H

## (undated) DEVICE — SUTURE MCRYL SZ 3-0 L27IN ABSRB UD L24MM PS-1 3/8 CIR PRIM Y936H

## (undated) DEVICE — NEEDLE HYPO 21GA L1.5IN INTRAMUSCULAR S STL LATCH BVL UP

## (undated) DEVICE — SYRINGE MED 20ML STD CLR PLAS LUERLOCK TIP N CTRL DISP

## (undated) DEVICE — SOLUTION IRRIG 3000ML 0.9% SOD CHL FLX CONT 0797208] ICU MEDICAL INC]

## (undated) DEVICE — REM POLYHESIVE ADULT PATIENT RETURN ELECTRODE: Brand: VALLEYLAB

## (undated) DEVICE — APPLICATOR BNDG 1MM ADH PREMIERPRO EXOFIN

## (undated) DEVICE — BLADE SAW W11.2XL77.5MM THK0.76MM CUT THK1.17MM REPL RECIP

## (undated) DEVICE — DRAPE XR C ARM 41X74IN LF --

## (undated) DEVICE — INFECTION CONTROL KIT SYS

## (undated) DEVICE — SYR LR LCK 1ML GRAD NSAF 30ML --

## (undated) DEVICE — PREP KIT PEEL PTCH POVIDONE IOD

## (undated) DEVICE — BLADE ELECTRODE: Brand: EDGE

## (undated) DEVICE — GOWN,SIRUS,NONRNF,SETINSLV,2XL,18/CS: Brand: MEDLINE

## (undated) DEVICE — TRAY CATH 16F URIN MTR LTX -- CONVERT TO ITEM 363111

## (undated) DEVICE — HANDPIECE SET WITH BONE CLEANING TIP AND SUCTION TUBE: Brand: INTERPULSE

## (undated) DEVICE — CONTAINER,SPECIMEN,3OZ,OR STRL: Brand: MEDLINE

## (undated) DEVICE — STERILE POLYISOPRENE POWDER-FREE SURGICAL GLOVES: Brand: PROTEXIS

## (undated) DEVICE — PREP SKN PREVAIL 40ML APPL --

## (undated) DEVICE — T4 HOOD

## (undated) DEVICE — CLIP LIG ADH PD HORZ MED BLU --

## (undated) DEVICE — 4-PORT MANIFOLD: Brand: NEPTUNE 2

## (undated) DEVICE — Z DISCONTINUED USE 2744636  DRESSING AQUACEL 14 IN ALG W3.5XL14IN POLYUR FLM CVR W/ HYDRCOLL

## (undated) DEVICE — NEEDLE HYPO 18GA L1.5IN PNK S STL HUB POLYPR SHLD REG BVL

## (undated) DEVICE — SUTURE STRATAFIX SYMMETRIC PDS + SZ 1 L18IN ABSRB VLT L48MM SXPP1A400